# Patient Record
Sex: MALE | Race: WHITE | Employment: OTHER | URBAN - METROPOLITAN AREA
[De-identification: names, ages, dates, MRNs, and addresses within clinical notes are randomized per-mention and may not be internally consistent; named-entity substitution may affect disease eponyms.]

---

## 2017-02-06 ENCOUNTER — HOSPITAL ENCOUNTER (EMERGENCY)
Facility: HOSPITAL | Age: 37
Discharge: HOME | End: 2017-02-06
Attending: EMERGENCY MEDICINE
Payer: COMMERCIAL

## 2017-02-06 ENCOUNTER — APPOINTMENT (OUTPATIENT)
Dept: ULTRASOUND IMAGING | Facility: HOSPITAL | Age: 37
End: 2017-02-06
Attending: EMERGENCY MEDICINE
Payer: COMMERCIAL

## 2017-02-06 VITALS
SYSTOLIC BLOOD PRESSURE: 121 MMHG | RESPIRATION RATE: 18 BRPM | HEIGHT: 68 IN | OXYGEN SATURATION: 95 % | BODY MASS INDEX: 36.37 KG/M2 | TEMPERATURE: 98 F | WEIGHT: 240 LBS | DIASTOLIC BLOOD PRESSURE: 77 MMHG | HEART RATE: 60 BPM

## 2017-02-06 DIAGNOSIS — K76.0 FATTY LIVER: ICD-10-CM

## 2017-02-06 DIAGNOSIS — R10.13 ABDOMINAL PAIN, EPIGASTRIC: ICD-10-CM

## 2017-02-06 DIAGNOSIS — E78.1 HIGH TRIGLYCERIDES: Primary | ICD-10-CM

## 2017-02-06 LAB
ALBUMIN SERPL-MCNC: 3.6 G/DL (ref 3.5–4.8)
ALP LIVER SERPL-CCNC: 82 U/L (ref 45–117)
ALT SERPL-CCNC: 41 U/L (ref 17–63)
AST SERPL-CCNC: 10 U/L (ref 15–41)
BASOPHILS # BLD AUTO: 0.03 X10(3) UL (ref 0–0.1)
BASOPHILS NFR BLD AUTO: 0.3 %
BILIRUB SERPL-MCNC: 0.9 MG/DL (ref 0.1–2)
BUN BLD-MCNC: 9 MG/DL (ref 8–20)
CALCIUM BLD-MCNC: 8.9 MG/DL (ref 8.3–10.3)
CHLORIDE: 101 MMOL/L (ref 101–111)
CO2: 24 MMOL/L (ref 22–32)
CREAT BLD-MCNC: 1.14 MG/DL (ref 0.7–1.3)
EOSINOPHIL # BLD AUTO: 0.16 X10(3) UL (ref 0–0.3)
EOSINOPHIL NFR BLD AUTO: 1.6 %
ERYTHROCYTE [DISTWIDTH] IN BLOOD BY AUTOMATED COUNT: 12.7 % (ref 11.5–16)
GLUCOSE BLD-MCNC: 229 MG/DL (ref 70–99)
HCT VFR BLD AUTO: 40.1 % (ref 37–53)
HGB BLD-MCNC: 15 G/DL (ref 13–17)
IMMATURE GRANULOCYTE COUNT: 0.04 X10(3) UL (ref 0–1)
IMMATURE GRANULOCYTE RATIO %: 0.4 %
LIPASE: 190 U/L (ref 73–393)
LYMPHOCYTES # BLD AUTO: 1.74 X10(3) UL (ref 0.9–4)
LYMPHOCYTES NFR BLD AUTO: 17.3 %
M PROTEIN MFR SERPL ELPH: 7.2 G/DL (ref 6.1–8.3)
MCH RBC QN AUTO: 32.5 PG (ref 27–33.2)
MCHC RBC AUTO-ENTMCNC: 37.4 G/DL (ref 31–37)
MCV RBC AUTO: 86.8 FL (ref 80–99)
MONOCYTES # BLD AUTO: 0.66 X10(3) UL (ref 0.1–0.6)
MONOCYTES NFR BLD AUTO: 6.6 %
NEUTROPHIL ABS PRELIM: 7.4 X10 (3) UL (ref 1.3–6.7)
NEUTROPHILS # BLD AUTO: 7.4 X10(3) UL (ref 1.3–6.7)
NEUTROPHILS NFR BLD AUTO: 73.8 %
PLATELET # BLD AUTO: 110 10(3)UL (ref 150–450)
POTASSIUM SERPL-SCNC: 3.8 MMOL/L (ref 3.6–5.1)
RBC # BLD AUTO: 4.62 X10(6)UL (ref 4.3–5.7)
RED CELL DISTRIBUTION WIDTH-SD: 39.3 FL (ref 35.1–46.3)
SODIUM SERPL-SCNC: 135 MMOL/L (ref 136–144)
TRIGLYCERIDES: 1814 MG/DL (ref ?–150)
WBC # BLD AUTO: 10 X10(3) UL (ref 4–13)

## 2017-02-06 PROCEDURE — 85025 COMPLETE CBC W/AUTO DIFF WBC: CPT | Performed by: EMERGENCY MEDICINE

## 2017-02-06 PROCEDURE — 84478 ASSAY OF TRIGLYCERIDES: CPT | Performed by: EMERGENCY MEDICINE

## 2017-02-06 PROCEDURE — 99285 EMERGENCY DEPT VISIT HI MDM: CPT

## 2017-02-06 PROCEDURE — 80053 COMPREHEN METABOLIC PANEL: CPT | Performed by: EMERGENCY MEDICINE

## 2017-02-06 PROCEDURE — 96374 THER/PROPH/DIAG INJ IV PUSH: CPT

## 2017-02-06 PROCEDURE — 83690 ASSAY OF LIPASE: CPT | Performed by: EMERGENCY MEDICINE

## 2017-02-06 PROCEDURE — 76700 US EXAM ABDOM COMPLETE: CPT

## 2017-02-06 PROCEDURE — 99284 EMERGENCY DEPT VISIT MOD MDM: CPT

## 2017-02-06 PROCEDURE — 96375 TX/PRO/DX INJ NEW DRUG ADDON: CPT

## 2017-02-06 PROCEDURE — 96361 HYDRATE IV INFUSION ADD-ON: CPT

## 2017-02-06 RX ORDER — ONDANSETRON 2 MG/ML
4 INJECTION INTRAMUSCULAR; INTRAVENOUS ONCE
Status: COMPLETED | OUTPATIENT
Start: 2017-02-06 | End: 2017-02-06

## 2017-02-06 RX ORDER — OMEPRAZOLE 20 MG/1
20 CAPSULE, DELAYED RELEASE ORAL DAILY
Qty: 30 CAPSULE | Refills: 0 | Status: SHIPPED | OUTPATIENT
Start: 2017-02-06 | End: 2017-03-08

## 2017-02-06 RX ORDER — SODIUM CHLORIDE 9 MG/ML
1000 INJECTION, SOLUTION INTRAVENOUS ONCE
Status: DISCONTINUED | OUTPATIENT
Start: 2017-02-06 | End: 2017-02-06

## 2017-02-06 RX ORDER — HYDROMORPHONE HYDROCHLORIDE 1 MG/ML
1 INJECTION, SOLUTION INTRAMUSCULAR; INTRAVENOUS; SUBCUTANEOUS ONCE
Status: COMPLETED | OUTPATIENT
Start: 2017-02-06 | End: 2017-02-06

## 2017-02-06 NOTE — ED PROVIDER NOTES
Patient Seen in: [facility] Emergency Department    History   Patient presents with:  Abdomen/Flank Pain (GI/)    Stated Complaint: ABD. PAIN    HPI    She is very pleasant 28-year-old male who presents with epigastric pain for the last 2 days.   P Hypertension Maternal Grandmother    • Heart Surgery Maternal Grandmother      CABG   • Heart Disease Maternal Grandmother    • Allergies Sister    • Neurological Disorder Sister    • Arthritis Maternal Grandfather      TKA         Smoking Status: Never Sm Notable for the following:     Triglycerides 1814 (*)     All other components within normal limits   CBC W/ DIFFERENTIAL - Abnormal; Notable for the following:     .0 (*)     MCHC 37.4 (*)     Neutrophil Absolute Prelim 7.40 (*)     Neutrophil Abso pancreatitis  Disposition:  Discharge    Follow-up:  Aga Ley MD  [address] [place] [address] Drive  [address] [facility]  [phone]    In 2 days        Medications Prescribed:  Discharge Medication List as of 2/6/2017  8:52 AM    START taking these medi

## 2017-12-19 PROBLEM — E11.9 TYPE 2 DIABETES MELLITUS WITHOUT COMPLICATION, WITHOUT LONG-TERM CURRENT USE OF INSULIN (HCC): Status: ACTIVE | Noted: 2017-12-19

## 2018-10-03 ENCOUNTER — HOSPITAL (OUTPATIENT)
Dept: OTHER | Age: 38
End: 2018-10-03
Attending: EMERGENCY MEDICINE

## 2022-07-07 ENCOUNTER — OFFICE VISIT (OUTPATIENT)
Dept: FAMILY MEDICINE CLINIC | Facility: CLINIC | Age: 42
End: 2022-07-07
Payer: COMMERCIAL

## 2022-07-07 VITALS
TEMPERATURE: 97 F | HEIGHT: 68 IN | WEIGHT: 215 LBS | RESPIRATION RATE: 18 BRPM | SYSTOLIC BLOOD PRESSURE: 138 MMHG | OXYGEN SATURATION: 99 % | HEART RATE: 91 BPM | DIASTOLIC BLOOD PRESSURE: 88 MMHG | BODY MASS INDEX: 32.58 KG/M2

## 2022-07-07 DIAGNOSIS — H61.21 HEARING LOSS OF RIGHT EAR DUE TO CERUMEN IMPACTION: Primary | ICD-10-CM

## 2022-07-07 PROCEDURE — 3008F BODY MASS INDEX DOCD: CPT | Performed by: NURSE PRACTITIONER

## 2022-07-07 PROCEDURE — 99202 OFFICE O/P NEW SF 15 MIN: CPT | Performed by: NURSE PRACTITIONER

## 2022-07-07 PROCEDURE — 3075F SYST BP GE 130 - 139MM HG: CPT | Performed by: NURSE PRACTITIONER

## 2022-07-07 PROCEDURE — 3079F DIAST BP 80-89 MM HG: CPT | Performed by: NURSE PRACTITIONER

## 2022-07-07 RX ORDER — EZETIMIBE 10 MG/1
TABLET ORAL
COMMUNITY

## 2022-07-07 RX ORDER — EMPAGLIFLOZIN 25 MG/1
TABLET, FILM COATED ORAL
COMMUNITY

## 2022-07-07 RX ORDER — ROSUVASTATIN CALCIUM 40 MG/1
TABLET, COATED ORAL
COMMUNITY

## 2022-07-07 RX ORDER — ORAL SEMAGLUTIDE 7 MG/1
TABLET ORAL
COMMUNITY

## 2022-07-07 RX ORDER — SITAGLIPTIN 100 MG/1
100 TABLET, FILM COATED ORAL DAILY
COMMUNITY
Start: 2022-04-27

## 2023-02-07 ENCOUNTER — HOSPITAL ENCOUNTER (EMERGENCY)
Facility: HOSPITAL | Age: 43
Discharge: HOME | End: 2023-02-07
Attending: EMERGENCY MEDICINE
Payer: COMMERCIAL

## 2023-02-07 ENCOUNTER — APPOINTMENT (OUTPATIENT)
Dept: CT IMAGING | Facility: HOSPITAL | Age: 43
End: 2023-02-07
Attending: EMERGENCY MEDICINE
Payer: COMMERCIAL

## 2023-02-07 VITALS
HEIGHT: 68 IN | DIASTOLIC BLOOD PRESSURE: 92 MMHG | BODY MASS INDEX: 33.04 KG/M2 | WEIGHT: 218 LBS | SYSTOLIC BLOOD PRESSURE: 134 MMHG | TEMPERATURE: 98 F | HEART RATE: 98 BPM | RESPIRATION RATE: 18 BRPM | OXYGEN SATURATION: 98 %

## 2023-02-07 DIAGNOSIS — R79.89 CREATININE ELEVATION: ICD-10-CM

## 2023-02-07 DIAGNOSIS — R10.9 ABDOMINAL PAIN, UNSPECIFIED ABDOMINAL LOCATION: Primary | ICD-10-CM

## 2023-02-07 LAB
AGGLUTINATION: PRESENT
ALBUMIN SERPL-MCNC: 3.9 G/DL (ref 3.4–5)
ALBUMIN/GLOB SERPL: 1.2 {RATIO} (ref 1–2)
ALP LIVER SERPL-CCNC: 72 U/L
ATRIAL RATE: 98 BPM
BASOPHILS # BLD AUTO: 0.04 X10(3) UL (ref 0–0.2)
BASOPHILS NFR BLD AUTO: 0.4 %
BILIRUB SERPL-MCNC: 0.6 MG/DL (ref 0.1–2)
BUN BLD-MCNC: 9 MG/DL (ref 7–18)
CALCIUM BLD-MCNC: 8.6 MG/DL (ref 8.5–10.1)
CHLORIDE SERPL-SCNC: 107 MMOL/L (ref 98–112)
CREAT BLD-MCNC: 1.68 MG/DL
EOSINOPHIL # BLD AUTO: 0.08 X10(3) UL (ref 0–0.7)
EOSINOPHIL NFR BLD AUTO: 0.8 %
ERYTHROCYTE [DISTWIDTH] IN BLOOD BY AUTOMATED COUNT: 13.5 %
GFR SERPLBLD BASED ON 1.73 SQ M-ARVRAT: 52 ML/MIN/1.73M2 (ref 60–?)
GLOBULIN PLAS-MCNC: 3.3 G/DL (ref 2.8–4.4)
GLUCOSE BLD-MCNC: 234 MG/DL (ref 70–99)
HCT VFR BLD AUTO: 43.4 %
HGB BLD-MCNC: 15.5 G/DL
IMM GRANULOCYTES # BLD AUTO: 0.07 X10(3) UL (ref 0–1)
IMM GRANULOCYTES NFR BLD: 0.7 %
LIPASE SERPL-CCNC: 121 U/L (ref 73–393)
LIPASE SERPL-CCNC: 36 U/L (ref 13–75)
LYMPHOCYTES # BLD AUTO: 1.23 X10(3) UL (ref 1–4)
LYMPHOCYTES NFR BLD AUTO: 12 %
MCH RBC QN AUTO: 31.3 PG (ref 26–34)
MCHC RBC AUTO-ENTMCNC: 35.7 G/DL (ref 31–37)
MCV RBC AUTO: 87.7 FL
MONOCYTES # BLD AUTO: 0.46 X10(3) UL (ref 0.1–1)
MONOCYTES NFR BLD AUTO: 4.5 %
NEUTROPHILS # BLD AUTO: 8.34 X10 (3) UL (ref 1.5–7.7)
NEUTROPHILS # BLD AUTO: 8.34 X10(3) UL (ref 1.5–7.7)
NEUTROPHILS NFR BLD AUTO: 81.6 %
OSMOLALITY SERPL CALC.SUM OF ELEC: 282 MOSM/KG (ref 275–295)
P AXIS: 56 DEGREES
P-R INTERVAL: 186 MS
PLATELET # BLD AUTO: 242 10(3)UL (ref 150–450)
PLATELET MORPHOLOGY: NORMAL
POTASSIUM SERPL-SCNC: 3.9 MMOL/L (ref 3.5–5.1)
PROT SERPL-MCNC: 7.2 G/DL (ref 6.4–8.2)
Q-T INTERVAL: 350 MS
QRS DURATION: 86 MS
QTC CALCULATION (BEZET): 446 MS
R AXIS: 4 DEGREES
RBC # BLD AUTO: 4.95 X10(6)UL
SARS-COV-2 RNA RESP QL NAA+PROBE: NOT DETECTED
SODIUM SERPL-SCNC: 133 MMOL/L (ref 136–145)
T AXIS: 25 DEGREES
TROPONIN I HIGH SENSITIVITY: 3.1 NG/L
VENTRICULAR RATE: 98 BPM
WBC # BLD AUTO: 10.2 X10(3) UL (ref 4–11)

## 2023-02-07 PROCEDURE — 99285 EMERGENCY DEPT VISIT HI MDM: CPT

## 2023-02-07 PROCEDURE — 85025 COMPLETE CBC W/AUTO DIFF WBC: CPT | Performed by: EMERGENCY MEDICINE

## 2023-02-07 PROCEDURE — 93005 ELECTROCARDIOGRAM TRACING: CPT

## 2023-02-07 PROCEDURE — 80053 COMPREHEN METABOLIC PANEL: CPT | Performed by: EMERGENCY MEDICINE

## 2023-02-07 PROCEDURE — 96374 THER/PROPH/DIAG INJ IV PUSH: CPT

## 2023-02-07 PROCEDURE — 83690 ASSAY OF LIPASE: CPT | Performed by: EMERGENCY MEDICINE

## 2023-02-07 PROCEDURE — 84484 ASSAY OF TROPONIN QUANT: CPT | Performed by: EMERGENCY MEDICINE

## 2023-02-07 PROCEDURE — 93010 ELECTROCARDIOGRAM REPORT: CPT

## 2023-02-07 PROCEDURE — 74176 CT ABD & PELVIS W/O CONTRAST: CPT | Performed by: EMERGENCY MEDICINE

## 2023-02-07 PROCEDURE — 96361 HYDRATE IV INFUSION ADD-ON: CPT

## 2023-02-07 RX ORDER — MORPHINE SULFATE 4 MG/ML
4 INJECTION, SOLUTION INTRAMUSCULAR; INTRAVENOUS EVERY 30 MIN PRN
Status: DISCONTINUED | OUTPATIENT
Start: 2023-02-07 | End: 2023-02-07

## 2023-02-07 RX ORDER — FENOFIBRATE 145 MG/1
160 TABLET, COATED ORAL DAILY
COMMUNITY

## 2023-02-07 NOTE — ED QUICK NOTES
IV site D/Stepan, discharge instructions including follow up care given to pt who verbalized understanding.  PT left ED AAOX3 NAD with steady gait

## 2023-02-07 NOTE — ED INITIAL ASSESSMENT (HPI)
Pt to ED for upper abdominal pain since yesterday. Of high triglycerides and pancreatitis.  Denies vomiting diarrhea and fever

## 2023-02-07 NOTE — ED QUICK NOTES
Rounding Completed    Plan of Care reviewed. Waiting for lab results  Elimination needs assessed. Provided update on delay of lab work. Bed is locked and in lowest position. Call light within reach.

## 2024-02-05 ENCOUNTER — APPOINTMENT (OUTPATIENT)
Dept: CT IMAGING | Age: 44
End: 2024-02-05
Attending: EMERGENCY MEDICINE
Payer: COMMERCIAL

## 2024-02-05 ENCOUNTER — HOSPITAL ENCOUNTER (EMERGENCY)
Age: 44
Discharge: HOME | End: 2024-02-05
Attending: EMERGENCY MEDICINE
Payer: COMMERCIAL

## 2024-02-05 VITALS
OXYGEN SATURATION: 95 % | HEART RATE: 105 BPM | RESPIRATION RATE: 17 BRPM | BODY MASS INDEX: 33.04 KG/M2 | DIASTOLIC BLOOD PRESSURE: 81 MMHG | SYSTOLIC BLOOD PRESSURE: 132 MMHG | TEMPERATURE: 98 F | HEIGHT: 68 IN | WEIGHT: 218 LBS

## 2024-02-05 DIAGNOSIS — K85.80 OTHER ACUTE PANCREATITIS WITHOUT INFECTION OR NECROSIS: Primary | ICD-10-CM

## 2024-02-05 LAB
ALBUMIN SERPL-MCNC: 3.5 G/DL (ref 3.4–5)
ALP LIVER SERPL-CCNC: 75 U/L
ANION GAP SERPL CALC-SCNC: 5 MMOL/L (ref 0–18)
BASOPHILS # BLD AUTO: 0.02 X10(3) UL (ref 0–0.2)
BASOPHILS NFR BLD AUTO: 0.2 %
BILIRUB SERPL-MCNC: 1.1 MG/DL (ref 0.1–2)
BILIRUB UR QL STRIP.AUTO: NEGATIVE
CHLORIDE SERPL-SCNC: 99 MMOL/L (ref 98–112)
CLARITY UR REFRACT.AUTO: CLEAR
CO2 SERPL-SCNC: 26 MMOL/L (ref 21–32)
COLOR UR AUTO: YELLOW
CREAT BLD-MCNC: 0.94 MG/DL
EGFRCR SERPLBLD CKD-EPI 2021: 103 ML/MIN/1.73M2 (ref 60–?)
EOSINOPHIL # BLD AUTO: 0.06 X10(3) UL (ref 0–0.7)
EOSINOPHIL NFR BLD AUTO: 0.7 %
ERYTHROCYTE [DISTWIDTH] IN BLOOD BY AUTOMATED COUNT: 13.6 %
GLUCOSE BLD-MCNC: 248 MG/DL (ref 70–99)
GLUCOSE UR STRIP.AUTO-MCNC: >=1000 MG/DL
HCT VFR BLD AUTO: 41.1 %
HGB BLD-MCNC: 15.2 G/DL
IMM GRANULOCYTES # BLD AUTO: 0.06 X10(3) UL (ref 0–1)
IMM GRANULOCYTES NFR BLD: 0.7 %
KETONES UR STRIP.AUTO-MCNC: 40 MG/DL
LEUKOCYTE ESTERASE UR QL STRIP.AUTO: NEGATIVE
LIPASE SERPL-CCNC: 1020 U/L (ref 13–75)
LYMPHOCYTES # BLD AUTO: 1.08 X10(3) UL (ref 1–4)
LYMPHOCYTES NFR BLD AUTO: 12.3 %
MCH RBC QN AUTO: 31.3 PG (ref 26–34)
MCHC RBC AUTO-ENTMCNC: 37 G/DL (ref 31–37)
MCV RBC AUTO: 84.6 FL
MONOCYTES # BLD AUTO: 0.45 X10(3) UL (ref 0.1–1)
MONOCYTES NFR BLD AUTO: 5.1 %
NEUTROPHILS # BLD AUTO: 7.1 X10 (3) UL (ref 1.5–7.7)
NEUTROPHILS # BLD AUTO: 7.1 X10(3) UL (ref 1.5–7.7)
NEUTROPHILS NFR BLD AUTO: 81 %
NITRITE UR QL STRIP.AUTO: NEGATIVE
PH UR STRIP.AUTO: 5.5 [PH] (ref 5–8)
PLATELET # BLD AUTO: 271 10(3)UL (ref 150–450)
POTASSIUM SERPL-SCNC: 4.5 MMOL/L (ref 3.5–5.1)
PROT UR STRIP.AUTO-MCNC: NEGATIVE MG/DL
RBC # BLD AUTO: 4.86 X10(6)UL
RBC UR QL AUTO: NEGATIVE
SODIUM SERPL-SCNC: 130 MMOL/L (ref 136–145)
SP GR UR STRIP.AUTO: 1.02 (ref 1–1.03)
UROBILINOGEN UR STRIP.AUTO-MCNC: 0.2 MG/DL
WBC # BLD AUTO: 8.8 X10(3) UL (ref 4–11)

## 2024-02-05 PROCEDURE — 74176 CT ABD & PELVIS W/O CONTRAST: CPT | Performed by: EMERGENCY MEDICINE

## 2024-02-05 PROCEDURE — 99284 EMERGENCY DEPT VISIT MOD MDM: CPT

## 2024-02-05 PROCEDURE — 81003 URINALYSIS AUTO W/O SCOPE: CPT | Performed by: EMERGENCY MEDICINE

## 2024-02-05 PROCEDURE — 83690 ASSAY OF LIPASE: CPT | Performed by: EMERGENCY MEDICINE

## 2024-02-05 PROCEDURE — 85025 COMPLETE CBC W/AUTO DIFF WBC: CPT | Performed by: EMERGENCY MEDICINE

## 2024-02-05 PROCEDURE — 36415 COLL VENOUS BLD VENIPUNCTURE: CPT

## 2024-02-05 PROCEDURE — 80053 COMPREHEN METABOLIC PANEL: CPT | Performed by: EMERGENCY MEDICINE

## 2024-02-05 RX ORDER — ESOMEPRAZOLE MAGNESIUM 40 MG/1
40 CAPSULE, DELAYED RELEASE ORAL 2 TIMES DAILY
COMMUNITY

## 2024-02-05 RX ORDER — HYDROCODONE BITARTRATE AND ACETAMINOPHEN 5; 325 MG/1; MG/1
1-2 TABLET ORAL EVERY 6 HOURS PRN
Qty: 10 TABLET | Refills: 0 | Status: SHIPPED | OUTPATIENT
Start: 2024-02-05 | End: 2024-02-10

## 2024-02-06 NOTE — DISCHARGE INSTRUCTIONS
Clear liquid diet x 24 hours, then advance if tolerated.    Norco as needed for pain.    Call your PCP for follow-up tomorrow.    Prescription given to repeat lipase in 2 days    Return here if pain worsens or new symptoms develop

## 2024-02-06 NOTE — ED PROVIDER NOTES
Patient Seen in: [facility] [place]      History     Chief Complaint   Patient presents with    Abdomen/Flank Pain     Stated Complaint: abd pain LUQ X 1 hour    Subjective:   HPI    43-year-old male with history of recurrent pancreatitis due to hypertriglyceridemia presents for evaluation of left upper quadrant pain for the past 2 to 3 hours.  Pain started suddenly which is slightly dissimilar to previous related to pancreatitis.  No associated nausea or vomiting.  Patient had eaten some cheese and sausage about an hour prior to onset of pain.  No history of kidney stone although kidney stones run in the family.  No urinary symptoms.    Objective:   Past Medical History:   Diagnosis Date    Acute pancreatitis 8/2010    Diabetes (HCC)     Elevated liver function tests     Glucose intolerance 9/2010    HYPERTRIGLYCERIDEMIA     SEASONAL ALLERGIES               Past Surgical History:   Procedure Laterality Date    BACK SURGERY                  Social History     Socioeconomic History    Marital status:      Spouse name: Robert    Number of children: 0    Years of education: 14   Occupational History    Occupation: [employer]     Comment: unemployed 12/2009   Tobacco Use    Smoking status: Never    Smokeless tobacco: Never   Vaping Use    Vaping Use: Never used   Substance and Sexual Activity    Alcohol use: Yes     Alcohol/week: 5.0 standard drinks of alcohol     Types: 5 Standard drinks or equivalent per week     Comment: socially    Drug use: No    Sexual activity: Yes     Partners: Female   Other Topics Concern     Service No    Blood Transfusions No    Caffeine Concern Yes     Comment: soda    Occupational Exposure No    Hobby Hazards No    Sleep Concern No    Stress Concern Yes    Weight Concern No    Special Diet No    Back Care No    Exercise Yes     Comment: weights, walking    Bike Helmet No    Seat Belt Yes    Self-Exams Yes   Social History Narrative    Lives with wife               Review of Systems    Positive for stated complaint: abd pain LUQ X 1 hour  Other systems are as noted in HPI.  Constitutional and vital signs reviewed.      All other systems reviewed and negative except as noted above.    Physical Exam     ED Triage Vitals [02/05/24 1830]   BP (!) 155/92   Pulse 106   Resp 18   Temp 98.5 °F (36.9 °C)   Temp src Temporal   SpO2 98 %   O2 Device None (Room air)       Current:/81   Pulse 105   Temp 98.4 °F (36.9 °C) (Temporal)   Resp 17   Ht 172.7 cm (5' 8\")   Wt 98.9 kg   SpO2 95%   BMI 33.15 kg/m²         Physical Exam    General: Alert, oriented, no apparent distress   Neck: Supple  Lungs: Clear to auscultation bilaterally.  Heart: Regular rate and rhythm.  Abdomen: Soft, nontender.   Skin: No rash.  No edema.  Neurologic: No focal neurologic deficits.  Normal speech pattern  Musculoskeletal: No tenderness or deformity noted.  Full range of motion throughout.        ED Course     Labs Reviewed   COMP METABOLIC PANEL (14) - Abnormal; Notable for the following components:       Result Value    Glucose 248 (*)     Sodium 130 (*)     All other components within normal limits   URINALYSIS, ROUTINE - Abnormal; Notable for the following components:    Glucose Urine >=1000 (*)     Ketones Urine 40 (*)     All other components within normal limits   LIPASE - Abnormal; Notable for the following components:    Lipase 1,020 (*)     All other components within normal limits   CBC WITH DIFFERENTIAL WITH PLATELET    Narrative:     The following orders were created for panel order CBC With Differential With Platelet.  Procedure                               Abnormality         Status                     ---------                               -----------         ------                     CBC W/ DIFFERENTIAL[[id number]]                              Final result                 Please view results for these tests on the individual orders.   CBC W/ DIFFERENTIAL                       MDM      Differential diagnosis includes gastritis, pancreatitis, hepatitis      Lipase 1020    Chemistry with a glucose of 248.  Normal potassium and bicarb.  Normal creatinine.  AST and ALT cannot be resulted due to the lipemic specimen    Patient was comfortable while observed in the ER.  States episodes of pancreatitis in the past have been much worse than this.  Would prefer to go home and follow his lipase and symptoms as outpatient.  Understands clear liquid diet and to advanced as tolerated        Return here for reevaluation if pain increases or is accompanied by fever, weakness or vomiting                     Medical Decision Making  Amount and/or Complexity of Data Reviewed  Independent Historian: parent     Details: Mother at bedside        Disposition and Plan     Clinical Impression:  1. Other acute pancreatitis without infection or necrosis         Disposition:  Discharge  2/5/2024  9:06 pm    Follow-up:  Uday Betancourt MD  [address] [place] [id number] [phone]    Call in 1 day(s)            Medications Prescribed:  Current Discharge Medication List        START taking these medications    Details   HYDROcodone-acetaminophen 5-325 MG Oral Tab Take 1-2 tablets by mouth every 6 (six) hours as needed for Pain.  Qty: 10 tablet, Refills: 0    Associated Diagnoses: Other acute pancreatitis without infection or necrosis

## 2024-04-15 ENCOUNTER — APPOINTMENT (OUTPATIENT)
Dept: CT IMAGING | Facility: HOSPITAL | Age: 44
End: 2024-04-15
Attending: EMERGENCY MEDICINE
Payer: COMMERCIAL

## 2024-04-15 ENCOUNTER — HOSPITAL ENCOUNTER (INPATIENT)
Facility: HOSPITAL | Age: 44
LOS: 2 days | Discharge: HOME | End: 2024-04-18
Attending: EMERGENCY MEDICINE | Admitting: HOSPITALIST
Payer: COMMERCIAL

## 2024-04-15 DIAGNOSIS — K85.80 OTHER ACUTE PANCREATITIS WITHOUT INFECTION OR NECROSIS (HCC): ICD-10-CM

## 2024-04-15 DIAGNOSIS — R73.9 HYPERGLYCEMIA: ICD-10-CM

## 2024-04-15 DIAGNOSIS — E78.1 HYPERTRIGLYCERIDEMIA, FAMILIAL: Primary | ICD-10-CM

## 2024-04-15 PROBLEM — E87.1 HYPONATREMIA: Status: ACTIVE | Noted: 2024-04-15

## 2024-04-15 PROBLEM — E87.20 METABOLIC ACIDOSIS: Status: ACTIVE | Noted: 2024-04-15

## 2024-04-15 LAB
ALBUMIN SERPL-MCNC: 3.7 G/DL (ref 3.4–5)
ALBUMIN/GLOB SERPL: 0.9 {RATIO} (ref 1–2)
ALP LIVER SERPL-CCNC: 69 U/L
ALT SERPL-CCNC: 40 U/L
ANION GAP SERPL CALC-SCNC: 11 MMOL/L (ref 0–18)
BASOPHILS # BLD AUTO: 0.03 X10(3) UL (ref 0–0.2)
BASOPHILS NFR BLD AUTO: 0.3 %
BILIRUB SERPL-MCNC: 0.8 MG/DL (ref 0.1–2)
BUN BLD-MCNC: 8 MG/DL (ref 9–23)
CALCIUM BLD-MCNC: 8.3 MG/DL (ref 8.5–10.1)
CHLORIDE SERPL-SCNC: 99 MMOL/L (ref 98–112)
CO2 SERPL-SCNC: 21 MMOL/L (ref 21–32)
CREAT BLD-MCNC: 0.98 MG/DL
EGFRCR SERPLBLD CKD-EPI 2021: 98 ML/MIN/1.73M2 (ref 60–?)
EOSINOPHIL # BLD AUTO: 0.12 X10(3) UL (ref 0–0.7)
EOSINOPHIL NFR BLD AUTO: 1.2 %
ERYTHROCYTE [DISTWIDTH] IN BLOOD BY AUTOMATED COUNT: 14.3 %
GLOBULIN PLAS-MCNC: 4.3 G/DL (ref 2.8–4.4)
GLUCOSE BLD-MCNC: 250 MG/DL (ref 70–99)
HCT VFR BLD AUTO: 37.9 %
HGB BLD-MCNC: 16.6 G/DL
IMM GRANULOCYTES # BLD AUTO: 0.08 X10(3) UL (ref 0–1)
IMM GRANULOCYTES NFR BLD: 0.8 %
LIPASE SERPL-CCNC: 718 U/L (ref 13–75)
LYMPHOCYTES # BLD AUTO: 1.29 X10(3) UL (ref 1–4)
LYMPHOCYTES NFR BLD AUTO: 13.1 %
MCH RBC QN AUTO: 35.6 PG (ref 26–34)
MCHC RBC AUTO-ENTMCNC: 43.8 G/DL (ref 31–37)
MCV RBC AUTO: 81.3 FL
MONOCYTES # BLD AUTO: 0.53 X10(3) UL (ref 0.1–1)
MONOCYTES NFR BLD AUTO: 5.4 %
NEUTROPHILS # BLD AUTO: 7.82 X10 (3) UL (ref 1.5–7.7)
NEUTROPHILS # BLD AUTO: 7.82 X10(3) UL (ref 1.5–7.7)
NEUTROPHILS NFR BLD AUTO: 79.2 %
OSMOLALITY SERPL CALC.SUM OF ELEC: 279 MOSM/KG (ref 275–295)
PLATELET # BLD AUTO: 263 10(3)UL (ref 150–450)
PLATELETS.RETICULATED NFR BLD AUTO: 0.9 % (ref 0–7)
PROT SERPL-MCNC: 8 G/DL (ref 6.4–8.2)
RBC # BLD AUTO: 4.66 X10(6)UL
SODIUM SERPL-SCNC: 131 MMOL/L (ref 136–145)
WBC # BLD AUTO: 9.9 X10(3) UL (ref 4–11)

## 2024-04-15 PROCEDURE — 74177 CT ABD & PELVIS W/CONTRAST: CPT | Performed by: EMERGENCY MEDICINE

## 2024-04-15 RX ORDER — FAMOTIDINE 10 MG/ML
20 INJECTION, SOLUTION INTRAVENOUS ONCE
Status: COMPLETED | OUTPATIENT
Start: 2024-04-15 | End: 2024-04-15

## 2024-04-15 RX ORDER — HYDROMORPHONE HYDROCHLORIDE 1 MG/ML
1 INJECTION, SOLUTION INTRAMUSCULAR; INTRAVENOUS; SUBCUTANEOUS ONCE
Status: COMPLETED | OUTPATIENT
Start: 2024-04-15 | End: 2024-04-15

## 2024-04-15 RX ORDER — INSULIN ASPART 100 [IU]/ML
0.1 INJECTION, SOLUTION INTRAVENOUS; SUBCUTANEOUS ONCE
Status: COMPLETED | OUTPATIENT
Start: 2024-04-15 | End: 2024-04-16

## 2024-04-15 RX ORDER — SODIUM CHLORIDE 9 MG/ML
125 INJECTION, SOLUTION INTRAVENOUS CONTINUOUS
Status: DISCONTINUED | OUTPATIENT
Start: 2024-04-15 | End: 2024-04-16

## 2024-04-15 RX ORDER — ONDANSETRON 2 MG/ML
4 INJECTION INTRAMUSCULAR; INTRAVENOUS ONCE
Status: COMPLETED | OUTPATIENT
Start: 2024-04-15 | End: 2024-04-15

## 2024-04-16 ENCOUNTER — APPOINTMENT (OUTPATIENT)
Dept: INTERVENTIONAL RADIOLOGY/VASCULAR | Facility: HOSPITAL | Age: 44
End: 2024-04-16
Attending: INTERNAL MEDICINE
Payer: COMMERCIAL

## 2024-04-16 PROBLEM — K85.80 OTHER ACUTE PANCREATITIS WITHOUT INFECTION OR NECROSIS (HCC): Status: ACTIVE | Noted: 2024-04-16

## 2024-04-16 PROBLEM — E78.1 HYPERTRIGLYCERIDEMIA, FAMILIAL: Status: ACTIVE | Noted: 2024-04-16

## 2024-04-16 LAB
BILIRUB UR QL STRIP.AUTO: NEGATIVE
CLARITY UR REFRACT.AUTO: CLEAR
COLOR UR AUTO: YELLOW
EST. AVERAGE GLUCOSE BLD GHB EST-MCNC: 220 MG/DL (ref 68–126)
GLUCOSE BLD-MCNC: 134 MG/DL (ref 70–99)
GLUCOSE BLD-MCNC: 149 MG/DL (ref 70–99)
GLUCOSE BLD-MCNC: 171 MG/DL (ref 70–99)
GLUCOSE BLD-MCNC: 210 MG/DL (ref 70–99)
GLUCOSE BLD-MCNC: 241 MG/DL (ref 70–99)
GLUCOSE UR STRIP.AUTO-MCNC: >=1000 MG/DL
HBA1C MFR BLD: 9.3 % (ref ?–5.7)
KETONES UR STRIP.AUTO-MCNC: 15 MG/DL
LEUKOCYTE ESTERASE UR QL STRIP.AUTO: NEGATIVE
NITRITE UR QL STRIP.AUTO: NEGATIVE
PH UR STRIP.AUTO: 7 [PH] (ref 5–8)
PROT UR STRIP.AUTO-MCNC: NEGATIVE MG/DL
RBC UR QL AUTO: NEGATIVE
SP GR UR STRIP.AUTO: 1.01 (ref 1–1.03)
TRIGL SERPL-MCNC: >5000 MG/DL (ref 30–149)
UROBILINOGEN UR STRIP.AUTO-MCNC: 0.2 MG/DL

## 2024-04-16 PROCEDURE — 02HV33Z INSERTION OF INFUSION DEVICE INTO SUPERIOR VENA CAVA, PERCUTANEOUS APPROACH: ICD-10-PCS | Performed by: RADIOLOGY

## 2024-04-16 PROCEDURE — 99223 1ST HOSP IP/OBS HIGH 75: CPT | Performed by: HOSPITALIST

## 2024-04-16 RX ORDER — INSULIN DEGLUDEC 100 U/ML
10 INJECTION, SOLUTION SUBCUTANEOUS DAILY
Status: DISCONTINUED | OUTPATIENT
Start: 2024-04-16 | End: 2024-04-18

## 2024-04-16 RX ORDER — MELATONIN
3 NIGHTLY PRN
Status: DISCONTINUED | OUTPATIENT
Start: 2024-04-16 | End: 2024-04-18

## 2024-04-16 RX ORDER — MORPHINE SULFATE 4 MG/ML
4 INJECTION, SOLUTION INTRAMUSCULAR; INTRAVENOUS EVERY 2 HOUR PRN
Status: DISCONTINUED | OUTPATIENT
Start: 2024-04-16 | End: 2024-04-18

## 2024-04-16 RX ORDER — PROCHLORPERAZINE EDISYLATE 5 MG/ML
5 INJECTION INTRAMUSCULAR; INTRAVENOUS EVERY 8 HOURS PRN
Status: DISCONTINUED | OUTPATIENT
Start: 2024-04-16 | End: 2024-04-18

## 2024-04-16 RX ORDER — METHYLPREDNISOLONE SODIUM SUCCINATE 125 MG/2ML
60 INJECTION, POWDER, LYOPHILIZED, FOR SOLUTION INTRAMUSCULAR; INTRAVENOUS ONCE AS NEEDED
Status: ACTIVE | OUTPATIENT
Start: 2024-04-16 | End: 2024-04-16

## 2024-04-16 RX ORDER — ALBUMIN, HUMAN INJ 5% 5 %
3000 SOLUTION INTRAVENOUS ONCE
Status: COMPLETED | OUTPATIENT
Start: 2024-04-16 | End: 2024-04-16

## 2024-04-16 RX ORDER — MORPHINE SULFATE 4 MG/ML
2 INJECTION, SOLUTION INTRAMUSCULAR; INTRAVENOUS EVERY 2 HOUR PRN
Status: DISCONTINUED | OUTPATIENT
Start: 2024-04-16 | End: 2024-04-18

## 2024-04-16 RX ORDER — SODIUM CHLORIDE, SODIUM LACTATE, POTASSIUM CHLORIDE, CALCIUM CHLORIDE 600; 310; 30; 20 MG/100ML; MG/100ML; MG/100ML; MG/100ML
INJECTION, SOLUTION INTRAVENOUS ONCE
Status: COMPLETED | OUTPATIENT
Start: 2024-04-16 | End: 2024-04-16

## 2024-04-16 RX ORDER — HYDROMORPHONE HYDROCHLORIDE 1 MG/ML
0.5 INJECTION, SOLUTION INTRAMUSCULAR; INTRAVENOUS; SUBCUTANEOUS EVERY 30 MIN PRN
Status: ACTIVE | OUTPATIENT
Start: 2024-04-16 | End: 2024-04-16

## 2024-04-16 RX ORDER — HEPARIN SODIUM 5000 [USP'U]/ML
INJECTION, SOLUTION INTRAVENOUS; SUBCUTANEOUS
Status: COMPLETED
Start: 2024-04-16 | End: 2024-04-16

## 2024-04-16 RX ORDER — SODIUM CHLORIDE 9 MG/ML
INJECTION, SOLUTION INTRAVENOUS CONTINUOUS
Status: ACTIVE | OUTPATIENT
Start: 2024-04-16 | End: 2024-04-16

## 2024-04-16 RX ORDER — DIPHENHYDRAMINE HYDROCHLORIDE 50 MG/ML
25 INJECTION INTRAMUSCULAR; INTRAVENOUS ONCE
Status: COMPLETED | OUTPATIENT
Start: 2024-04-16 | End: 2024-04-16

## 2024-04-16 RX ORDER — NICOTINE POLACRILEX 4 MG
15 LOZENGE BUCCAL
Status: DISCONTINUED | OUTPATIENT
Start: 2024-04-16 | End: 2024-04-18

## 2024-04-16 RX ORDER — LIDOCAINE HYDROCHLORIDE 10 MG/ML
INJECTION, SOLUTION INFILTRATION; PERINEURAL
Status: COMPLETED
Start: 2024-04-16 | End: 2024-04-16

## 2024-04-16 RX ORDER — ACETAMINOPHEN 500 MG
500 TABLET ORAL EVERY 4 HOURS PRN
Status: DISCONTINUED | OUTPATIENT
Start: 2024-04-16 | End: 2024-04-18

## 2024-04-16 RX ORDER — MORPHINE SULFATE 4 MG/ML
1 INJECTION, SOLUTION INTRAMUSCULAR; INTRAVENOUS EVERY 2 HOUR PRN
Status: DISCONTINUED | OUTPATIENT
Start: 2024-04-16 | End: 2024-04-18

## 2024-04-16 RX ORDER — SODIUM CHLORIDE, SODIUM LACTATE, POTASSIUM CHLORIDE, CALCIUM CHLORIDE 600; 310; 30; 20 MG/100ML; MG/100ML; MG/100ML; MG/100ML
INJECTION, SOLUTION INTRAVENOUS CONTINUOUS
Status: DISCONTINUED | OUTPATIENT
Start: 2024-04-16 | End: 2024-04-18

## 2024-04-16 RX ORDER — ONDANSETRON 2 MG/ML
4 INJECTION INTRAMUSCULAR; INTRAVENOUS EVERY 4 HOURS PRN
Status: ACTIVE | OUTPATIENT
Start: 2024-04-16 | End: 2024-04-16

## 2024-04-16 RX ORDER — NICOTINE POLACRILEX 4 MG
30 LOZENGE BUCCAL
Status: DISCONTINUED | OUTPATIENT
Start: 2024-04-16 | End: 2024-04-18

## 2024-04-16 RX ORDER — DEXTROSE MONOHYDRATE 25 G/50ML
50 INJECTION, SOLUTION INTRAVENOUS
Status: DISCONTINUED | OUTPATIENT
Start: 2024-04-16 | End: 2024-04-18

## 2024-04-16 RX ORDER — ENOXAPARIN SODIUM 100 MG/ML
40 INJECTION SUBCUTANEOUS DAILY
Status: DISCONTINUED | OUTPATIENT
Start: 2024-04-16 | End: 2024-04-18

## 2024-04-16 RX ORDER — DIPHENHYDRAMINE HCL 25 MG
25 CAPSULE ORAL ONCE
Status: COMPLETED | OUTPATIENT
Start: 2024-04-16 | End: 2024-04-16

## 2024-04-16 RX ORDER — ONDANSETRON 2 MG/ML
4 INJECTION INTRAMUSCULAR; INTRAVENOUS EVERY 6 HOURS PRN
Status: DISCONTINUED | OUTPATIENT
Start: 2024-04-16 | End: 2024-04-18

## 2024-04-16 NOTE — CONSULTS
[facility]  Report of Consultation    Ramón Nunez Patient Status:  Inpatient    10/10/1980 MRN [id number]   Location [facility] 0SW-A Attending Woody Valdovinos MD   Hosp Day # 0 PCP Uday Betancourt MD     Reason for Consultation:  Hypertriglyceridemia  Pancreatitis - acute     History of Present Illness:  Ramón Nunez is a a(n) 43 year old male with hx of DM, familial hypertriglyceridemia , who presnets to the hospital w/ complain of abdominal pain.   He has has prior episodes of acute pancreatitis related to TGs    Elevated lipase 718  A1C9.3  TG>5000    CT findings c/w with acute pancreatitis     History:  Past Medical History:    Acute pancreatitis (HCC)    Diabetes (HCC)    Elevated liver function tests    Glucose intolerance    HYPERTRIGLYCERIDEMIA    SEASONAL ALLERGIES     Past Surgical History:   Procedure Laterality Date    Back surgery       Family History   Problem Relation Age of Onset    Heart Disease Paternal Grandmother         CONGESTIVE HEART FAILURE    Lipids Paternal Grandmother     Diabetes Paternal Grandmother     Neurological Disorder Paternal Grandfather         alzheimer    Heart Disease Paternal Grandfather     Cancer Paternal Grandfather         skin    Stroke Paternal Grandfather     High Cholesterol Father     Lipids Father     Diabetes Father     Obesity Father     Gastro-Intestinal Disorder Father         GERD, diverticulosis    Arthritis Maternal Grandmother         SLE    High Cholesterol Maternal Grandmother     Hypertension Maternal Grandmother     Heart Surgery Maternal Grandmother         CABG    Heart Disease Maternal Grandmother     Allergies Sister     Neurological Disorder Sister     Arthritis Maternal Grandfather         TKA      reports that he has never smoked. He has never used smokeless tobacco. He reports current alcohol use of about 5.0 standard drinks of alcohol per week. He reports that he does not use drugs.    Allergies:  No Known Allergies    Current  Medications:    Current Facility-Administered Medications:     glucose (Dex4) 15 GM/59ML oral liquid 15 g, 15 g, Oral, Q15 Min PRN **OR** glucose (Glutose) 40% oral gel 15 g, 15 g, Oral, Q15 Min PRN **OR** glucose-vitamin C (Dex-4) chewable tab 4 tablet, 4 tablet, Oral, Q15 Min PRN **OR** dextrose 50% injection 50 mL, 50 mL, Intravenous, Q15 Min PRN **OR** glucose (Dex4) 15 GM/59ML oral liquid 30 g, 30 g, Oral, Q15 Min PRN **OR** glucose (Glutose) 40% oral gel 30 g, 30 g, Oral, Q15 Min PRN **OR** glucose-vitamin C (Dex-4) chewable tab 8 tablet, 8 tablet, Oral, Q15 Min PRN    melatonin tab 3 mg, 3 mg, Oral, Nightly PRN    lactated ringers infusion, , Intravenous, Continuous    enoxaparin (Lovenox) 40 MG/0.4ML SUBQ injection 40 mg, 40 mg, Subcutaneous, Daily    acetaminophen (Tylenol Extra Strength) tab 500 mg, 500 mg, Oral, Q4H PRN    morphINE PF 4 MG/ML injection 1 mg, 1 mg, Intravenous, Q2H PRN **OR** morphINE PF 4 MG/ML injection 2 mg, 2 mg, Intravenous, Q2H PRN **OR** morphINE PF 4 MG/ML injection 4 mg, 4 mg, Intravenous, Q2H PRN    ondansetron (Zofran) 4 MG/2ML injection 4 mg, 4 mg, Intravenous, Q6H PRN    prochlorperazine (Compazine) 10 MG/2ML injection 5 mg, 5 mg, Intravenous, Q8H PRN    insulin aspart (NovoLOG) 100 Units/mL FlexPen 1-10 Units, 1-10 Units, Subcutaneous, TID AC and HS  Home Medications:  No current outpatient medications on file.       Review of Systems:  See HPI; A total of 12 systems reviewed and otherwise unremarkable.    Physical Exam:  Vital signs: Blood pressure 144/85, pulse 112, temperature 97.7 °F (36.5 °C), temperature source Temporal, resp. rate 18, height 5' 8\" (1.727 m), weight 216 lb 6.4 oz (98.2 kg), SpO2 97%.  General: No acute distress. Alert and oriented x 3.  HEENT: Moist mucous membranes. EOM-I. PERRL  Neck: No lymphadenopathy.  No JVD. No carotid bruits.  Respiratory: Clear to auscultation bilaterally.  No wheezes. No rhonchi.  Cardiovascular: S1, S2.  Regular rate and  rhythm.  No murmurs. Equal pulses   Abdomen: Soft, ND:+ abd pain.    Neurologic: No focal neurological deficits.   Musculoskeletal: Full range of motion of all extremities.  No swelling noted.   Integument: No lesions. No erythema.  Psychiatric: Appropriate mood and affect.    Laboratory:  Lab Results   Component Value Date    WBC 9.9 04/15/2024    HGB 16.6 04/15/2024    HCT 37.9 04/15/2024    .0 04/15/2024    CREATSERUM 0.98 04/15/2024    BUN 8 04/15/2024     04/15/2024    K  04/15/2024      Comment:      Specimen hemolyzed. Specimen needs to be recollected.      CL 99 04/15/2024    CO2 21.0 04/15/2024     04/15/2024    CA 8.3 04/15/2024    ALB 3.7 04/15/2024    ALKPHO 69 04/15/2024    BILT 0.8 04/15/2024    TP 8.0 04/15/2024    AST  04/15/2024      Comment:      Specimen hemolyzed. Specimen needs to be recollected.     ALT 40 04/15/2024     04/15/2024    PGLU 210 04/16/2024     Lab Results   Component Value Date    COLORUR Yellow 04/16/2024    CLARITY Clear 04/16/2024    SPECGRAVITY 1.015 04/16/2024    GLUUR >=1000 04/16/2024    BILUR Negative 04/16/2024    KETUR 15 04/16/2024    BLOODURINE Negative 04/16/2024    PHURINE 7.0 04/16/2024    PROUR Negative 04/16/2024    UROBILINOGEN 0.2 04/16/2024    NITRITE Negative 04/16/2024    LEUUR Negative 04/16/2024       BUN   Date Value   04/15/2024 8 mg/dL (L)   02/05/2024      Comment:     Unable to report due to lipemia; spoke to [id number]     02/07/2023 9 mg/dL     Blood Urea Nitrogen (mg/dL)   Date Value   12/09/2017 11   06/06/2017 11   05/18/2016 16   04/11/2014 15   05/11/2012 15     Creatinine, Serum (mg/dL)   Date Value   05/18/2016 1.04   04/11/2014 1.15   05/11/2012 1.24     Creatinine (mg/dL)   Date Value   04/15/2024 0.98   02/05/2024 0.94   02/07/2023 1.68 (H)   12/09/2017 0.97   06/06/2017 1.22         Imaging:  Impression   CONCLUSION:       Inflammatory changes involving the pancreatic head and uncinate process is also extend around  the duodenum.  This is likely sequelae of acute pancreatitis.  Sequelae of duodenitis is of consideration as well     Impression:  Acute pancreatitis related to hypertriglyceridemia  (reportedly familial)  - fluids/NPO  - pain control  - discussed role of PLEX w/ patient- risk/benefits reviewed- he wants to think about it and discuss w/ wife. Will let us know if he wants to proceed  DM; poor control; A1C 9.3  Obesity   HTN - controlled; monitor   GERD    Care reviewed w/ pt and RN    Thank you for allowing me to participate in this patient's care.  Please feel free to call me with any questions or concerns.    Milan Iniguez MD  4/16/2024

## 2024-04-16 NOTE — ED QUICK NOTES
Orders for admission, patient is aware of plan and ready to go upstairs. Any questions, please call ED RN diogo at extension [phone].     Patient Covid vaccination status: Fully vaccinated     COVID Test Ordered in ED: None    COVID Suspicion at Admission: N/A    Running Infusions:  lactated ringers    Mental Status/LOC at time of transport: A&O x3     Other pertinent information:   CIWA score: N/A   NIH score:  N/A

## 2024-04-16 NOTE — ED PROVIDER NOTES
Patient Seen in: [facility] Emergency Department      History     Chief Complaint   Patient presents with    Abdomen/Flank Pain     Stated Complaint: LOWER ABD PAIN, NO FEVER, NO N/V/D    Subjective:   HPI    42 yo with pain.  It feels like his pancreatitis.  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  He has a familial history of hypertriglyceridemia it was found incidentally when the patient was started on Accutane and they were trying to follow his LFTs but they could not result in the lab.  Since that time patient has been treated for his high triglycerides but at times his triglycerides spike and he gets pancreatitis.  He occasionally drinks beer because he is a  but it is not excessive.  He does and has in the past seen cardiology and his primary care physician he does take fenofibrate he does take a statin as well as fish oil but his triglycerides have remained really high and states that he very frequently has difficulty when he gets blood drawn he is recalled because blood cannot be interpreted.  He is again having an increase in abdominal pain so comes to the emergency department for treatment and evaluation.    Objective:   Past Medical History:    Acute pancreatitis (HCC)    Diabetes (HCC)    Elevated liver function tests    Glucose intolerance    HYPERTRIGLYCERIDEMIA    SEASONAL ALLERGIES              Past Surgical History:   Procedure Laterality Date    Back surgery                  Social History     Socioeconomic History    Marital status:      Spouse name: Robert    Number of children: 0    Years of education: 14   Occupational History    Occupation:      Comment: unemployed 12/2009   Tobacco Use    Smoking status: Never    Smokeless tobacco: Never   Vaping Use    Vaping status: Never Used   Substance and Sexual Activity    Alcohol use: Yes     Alcohol/week: 5.0 standard drinks of alcohol     Types: 5 Standard drinks or  equivalent per week     Comment: socially    Drug use: No    Sexual activity: Yes     Partners: Female   Other Topics Concern     Service No    Blood Transfusions No    Caffeine Concern Yes     Comment: soda    Occupational Exposure No    Hobby Hazards No    Sleep Concern No    Stress Concern Yes    Weight Concern No    Special Diet No    Back Care No    Exercise Yes     Comment: weights, walking    Bike Helmet No    Seat Belt Yes    Self-Exams Yes   Social History Narrative    Lives with wife              Review of Systems    Positive for stated complaint: LOWER ABD PAIN, NO FEVER, NO N/V/D  Other systems are as noted in HPI.  Constitutional and vital signs reviewed.      All other systems reviewed and negative except as noted above.    Physical Exam     ED Triage Vitals [04/15/24 1955]   BP (!) 145/92   Pulse 103   Resp 18   Temp 97.7 °F (36.5 °C)   Temp src Temporal   SpO2 96 %   O2 Device None (Room air)       Current:/86   Pulse 102   Temp 97.7 °F (36.5 °C) (Temporal)   Resp 18   Ht 172.7 cm (5' 8\")   Wt 97.5 kg   SpO2 94%   BMI 32.69 kg/m²         Physical Exam  Vitals and nursing note reviewed.   Constitutional:       General: He is not in acute distress.     Appearance: He is well-developed. He is not diaphoretic.   HENT:      Head: Atraumatic.      Right Ear: External ear normal.      Left Ear: External ear normal.      Nose: Nose normal.      Mouth/Throat:      Comments: Very dry oral mucosa  Eyes:      General: No scleral icterus.        Right eye: No discharge.         Left eye: No discharge.      Conjunctiva/sclera: Conjunctivae normal.      Pupils: Pupils are equal, round, and reactive to light.   Neck:      Vascular: No JVD.   Cardiovascular:      Rate and Rhythm: Normal rate and regular rhythm.      Heart sounds: Normal heart sounds. No murmur heard.     No friction rub. No gallop.   Pulmonary:      Effort: Pulmonary effort is normal. No respiratory distress.      Breath sounds:  Normal breath sounds. No stridor. No wheezing.   Chest:      Chest wall: No tenderness.   Abdominal:      General: Bowel sounds are normal. There is no distension.      Palpations: Abdomen is soft.      Tenderness: There is abdominal tenderness in the right upper quadrant, epigastric area and left upper quadrant. There is no guarding or rebound.   Musculoskeletal:         General: No tenderness or deformity. Normal range of motion.      Cervical back: Normal range of motion and neck supple.   Lymphadenopathy:      Cervical: No cervical adenopathy.   Skin:     General: Skin is warm and dry.      Coloration: Skin is not pale.      Findings: No erythema or rash.   Neurological:      Mental Status: He is alert and oriented to person, place, and time.      Cranial Nerves: No cranial nerve deficit.      Coordination: Coordination normal.   Psychiatric:         Behavior: Behavior normal.         Judgment: Judgment normal.               ED Course     Labs Reviewed   COMP METABOLIC PANEL (14) - Abnormal; Notable for the following components:       Result Value    Glucose 250 (*)     Sodium 131 (*)     BUN 8 (*)     A/G Ratio 0.9 (*)     All other components within normal limits   LIPASE - Abnormal; Notable for the following components:    Lipase 718 (*)     All other components within normal limits   CALCIUM - Abnormal; Notable for the following components:    Calcium, Total 8.3 (*)     All other components within normal limits   POCT GLUCOSE - Abnormal; Notable for the following components:    POC Glucose 241 (*)     All other components within normal limits   CBC W/ DIFFERENTIAL - Abnormal; Notable for the following components:    HCT 37.9 (*)     MCH 35.6 (*)     MCHC 43.8 (*)     Neutrophil Absolute Prelim 7.82 (*)     Neutrophil Absolute 7.82 (*)     All other components within normal limits   CBC WITH DIFFERENTIAL WITH PLATELET    Narrative:     The following orders were created for panel order CBC With Differential  With Platelet.  Procedure                               Abnormality         Status                     ---------                               -----------         ------                     CBC W/ DIFFERENTIAL[[id number]]          Abnormal            Final result                 Please view results for these tests on the individual orders.   REDRAW CMP (P)   URINALYSIS WITH CULTURE REFLEX   TRIGLYCERIDES   RAINBOW DRAW LAVENDER   RAINBOW DRAW LIGHT GREEN   RAINBOW DRAW BLUE   RAINBOW DRAW GOLD          ED Course as of 04/16/24 0012  ------------------------------------------------------------  Time: 04/15 2137  Value: Lipase(!!): 718  Comment: (Reviewed)  ------------------------------------------------------------  Time: 04/15 2137  Value: Sodium(!): 131  Comment: (Reviewed)  ------------------------------------------------------------  Time: 04/15 2137  Value: WBC: 9.9  Comment: (Reviewed)  ------------------------------------------------------------  Time: 04/15 2137  Value: Glucose(!): 250  Comment: (Reviewed)     CT ABDOMEN+PELVIS(CONTRAST ONLY)(CPT=74177)   Final Result   PROCEDURE:  CT ABDOMEN+PELVIS (CONTRAST ONLY) (CPT=74177)       COMPARISON:  [facility], CT, CT ABDOMEN+PELVIS KIDNEYSTONE 2D RNDR(NO    IV,NO ORAL)(CPT=74176), 2/05/2024, 7:54 PM.       INDICATIONS:  LOWER ABD PAIN, NO FEVER, NO N/V/D       TECHNIQUE:  CT scanning was performed from the dome of the diaphragm to    the pubic symphysis with non-ionic intravenous contrast material. Post    contrast coronal MPR imaging was performed.  Dose reduction techniques    were used. Dose information is    transmitted to the ACR (American College of Radiology) NRDR (National    Radiology Data Registry) which includes the Dose Index Registry.       PATIENT STATED HISTORY:(As transcribed by Technologist)  lower abd pain        CONTRAST USED:  100 mLcc of Isovue 370       FINDINGS:     LIVER:  No enlargement, atrophy, abnormal density, or significant focal     lesion.     BILIARY:  No visible dilatation or calcification.     PANCREAS:  Inflammatory changes involving the pancreatic head and uncinate    process extend around the duodenum.   SPLEEN:  Borderline enlarged spleen.   KIDNEYS:  No mass, obstruction, or calcification.     ADRENALS:  No mass or enlargement.     AORTA/VASCULAR:  No aneurysm or dissection.     RETROPERITONEUM:  No mass or adenopathy.     BOWEL/MESENTERY:  No visible mass, obstruction, or bowel wall thickening.     The appendix is normal.   ABDOMINAL WALL:  No mass or hernia.     URINARY BLADDER:  No visible focal wall thickening, lesion, or calculus.     PELVIC NODES:  No adenopathy.     PELVIC ORGANS:  No visible mass.  Pelvic organs appropriate for patient    age.     BONES:  No bony lesion or fracture.     LUNG BASES:  No visible pulmonary or pleural disease.     OTHER:  Negative.                           =====   CONCLUSION:         Inflammatory changes involving the pancreatic head and uncinate process is    also extend around the duodenum.  This is likely sequelae of acute    pancreatitis.  Sequelae of duodenitis is of consideration as well.           LOCATION:  [facility]           Dictated by (CST): Kb Workman MD on 4/15/2024 at 11:00 PM        Finalized by (CST): Kb Workman MD on 4/15/2024 at 11:02 PM         CT done to rule out pancreatic pseudocyst do suspect the patient has acute pancreatitis I do not see signs of necrotizing pancreatitis.           MDM      43-year-old presents to the emergency department with his wife for concern for upper abdominal pain feels like he has pancreatitis again.  Unfortunately he has significant history of severe familial hypertriglyceridemia    Immediately started aggressive IV fluid hydration, pain control, insulin to help drive down the triglycerides.  Will check the triglycerides to see how high they are.  Discussed the case with Dr. Ortega for admission.  Patient has never had  plasmapheresis lab is called twice tells me that cannot even centrifuge down his blood to get appropriate reading secondary to severe hypertriglyceridemia.    Did have a couple beers over the weekend with the very nice weather but it does come to the point that he almost never has walking at all he feels like it is possible he did not hydrate well enough he has excessive thirst all the time                           Medical Decision Making      Disposition and Plan     Clinical Impression:  1. Hypertriglyceridemia, familial    2. Other acute pancreatitis without infection or necrosis (HCC)    3. Hyperglycemia         Disposition:  There is no disposition on file for this visit.  There is no disposition time on file for this visit.    Follow-up:  No follow-up provider specified.        Medications Prescribed:  Current Discharge Medication List

## 2024-04-16 NOTE — PLAN OF CARE
Plasmapheresis ordered earlier. [facility] notified-call when line is placed. Reviewed with IR several times about timing of catheter insertion, still waiting for it to be done.     1610-updated Dr. Iniguez about still waiting for catheter placement. Wants to get it done today. Patient to transfer to different room.

## 2024-04-16 NOTE — H&P
[facility]     Ramón Nunez Patient Status:  Emergency    10/10/1980 MRN [id number]   Location [facility] EMERGENCY DEPARTMENT Attending Tati Downs MD   Hosp Day # 0 PCP Uday Betancourt MD     Chief Complaint: Abdominal pain    Subjective:    History of Present Illness:     Ramón Nunez is a 43 year old male with history of familial hypertriglyceridemia, type 2 diabetes presents to the emergency room with increased abdominal pain similar to previous episodes of pancreatitis.  Patient has history of familial hypertriglyceridemia was started on Accutane..  Patient reportedly is on a drug trial through [facility].  Patient denies any nausea, vomiting, diarrhea or constipation.  No chest pain, shortness of breath, dizziness, lightheadedness, or syncope.    History/Other:    Past Medical History:  Past Medical History:    Acute pancreatitis (HCC)    Diabetes (HCC)    Elevated liver function tests    Glucose intolerance    HYPERTRIGLYCERIDEMIA    SEASONAL ALLERGIES     Past Surgical History:   Past Surgical History:   Procedure Laterality Date    Back surgery        Family History:   Family History   Problem Relation Age of Onset    Heart Disease Paternal Grandmother         CONGESTIVE HEART FAILURE    Lipids Paternal Grandmother     Diabetes Paternal Grandmother     Neurological Disorder Paternal Grandfather         alzheimer    Heart Disease Paternal Grandfather     Cancer Paternal Grandfather         skin    Stroke Paternal Grandfather     High Cholesterol Father     Lipids Father     Diabetes Father     Obesity Father     Gastro-Intestinal Disorder Father         GERD, diverticulosis    Arthritis Maternal Grandmother         SLE    High Cholesterol Maternal Grandmother     Hypertension Maternal Grandmother     Heart Surgery Maternal Grandmother         CABG    Heart Disease Maternal Grandmother     Allergies Sister     Neurological Disorder Sister     Arthritis Maternal  Grandfather         TKA     Social History:    reports that he has never smoked. He has never used smokeless tobacco. He reports current alcohol use of about 5.0 standard drinks of alcohol per week. He reports that he does not use drugs.     Allergies: No Known Allergies    Medications:    No current facility-administered medications on file prior to encounter.     Current Outpatient Medications on File Prior to Encounter   Medication Sig Dispense Refill    [] HYDROcodone-acetaminophen 5-325 MG Oral Tab Take 1-2 tablets by mouth every 6 (six) hours as needed for Pain. 10 tablet 0    FREESTYLE LANCETS Does not apply Misc Test daily and prn 100 Each 3       Review of Systems:   A comprehensive review of systems was completed.    Pertinent positives and negatives noted in the HPI.    Objective:   Physical Exam:    /86   Pulse 102   Temp 97.7 °F (36.5 °C) (Temporal)   Resp 18   Ht 5' 8\" (1.727 m)   Wt 215 lb (97.5 kg)   SpO2 94%   BMI 32.69 kg/m²   General: No acute distress, Alert  Respiratory: No rhonchi, no wheezes  Cardiovascular: S1, S2. Regular rate and rhythm  Abdomen: Soft, Non-tender, non-distended, positive bowel sounds  Neuro: No new focal deficits  Extremities: No edema      Results:    Labs:      Labs Last 24 Hours:    Recent Labs   Lab 04/15/24  2004   RBC 4.66   HGB 16.6   HCT 37.9*   MCV 81.3   MCH 35.6*   MCHC 43.8*   RDW 14.3   NEPRELIM 7.82*   WBC 9.9   .0       Recent Labs   Lab 04/15/24  2004 04/15/24  2143   *  --    BUN 8*  --    CREATSERUM 0.98  --    EGFRCR 98  --    CA  --  8.3*   ALB 3.7  --    *  --    CL 99  --    CO2 21.0  --    ALKPHO 69  --    ALT 40  --    BILT 0.8  --    TP 8.0  --        No results found for: \"PT\", \"INR\"    No results for input(s): \"TROP\", \"TROPHS\", \"CK\" in the last 168 hours.    No results for input(s): \"TROP\", \"PBNP\" in the last 168 hours.    No results for input(s): \"PCT\" in the last 168 hours.    Imaging: Imaging data  reviewed in Epic.    Assessment & Plan:      # Acute pancreatitis  -Secondary to hypertriglyceridemia  -Lipid panel pending  -Nephrology on consult  -Strict n.p.o.  -Continue IV fluids  -Continue IV pain medication as needed.    # Hypertriglyceridemia  -Patient reportedly on a drug trial  -Patient supposedly to find out in the next week or so if he was actually getting the medication versus placebo.    # Type 2 diabetes  -Placed on hypoglycemia protocol with correction factor insulin.    Plan of care discussed with patient at bedside.    Levi Alex, DO    Supplementary Documentation:     The 21st Century Cures Act makes medical notes like these available to patients in the interest of transparency. Please be advised this is a medical document. Medical documents are intended to carry relevant information, facts as evident, and the clinical opinion of the practitioner. The medical note is intended as peer to peer communication and may appear blunt or direct. It is written in medical language and may contain abbreviations or verbiage that are unfamiliar.

## 2024-04-16 NOTE — ED INITIAL ASSESSMENT (HPI)
Patient here with c/o abdominal pain.  Patient has hx of pancreatitis and reports pain feels the same.  Denies N/V/D

## 2024-04-16 NOTE — PLAN OF CARE
Assumed patient care this morning.   Alert, awake. Breathing unlabored. Pain controlled. Npo. Bowel movement this morning. Seen by renal, agreed to plasmapheresis.

## 2024-04-17 LAB
ALBUMIN SERPL-MCNC: 3.7 G/DL (ref 3.4–5)
ALBUMIN/GLOB SERPL: 1.7 {RATIO} (ref 1–2)
ALP LIVER SERPL-CCNC: 30 U/L
ALT SERPL-CCNC: 16 U/L
ANION GAP SERPL CALC-SCNC: 15 MMOL/L (ref 0–18)
AST SERPL-CCNC: 13 U/L (ref 15–37)
BASOPHILS # BLD AUTO: 0.02 X10(3) UL (ref 0–0.2)
BASOPHILS # BLD AUTO: 0.03 X10(3) UL (ref 0–0.2)
BASOPHILS NFR BLD AUTO: 0.2 %
BASOPHILS NFR BLD AUTO: 0.2 %
BILIRUB SERPL-MCNC: 1.4 MG/DL (ref 0.1–2)
BUN BLD-MCNC: 9 MG/DL (ref 9–23)
CALCIUM BLD-MCNC: 8.5 MG/DL (ref 8.5–10.1)
CHLORIDE SERPL-SCNC: 108 MMOL/L (ref 98–112)
CO2 SERPL-SCNC: 17 MMOL/L (ref 21–32)
CREAT BLD-MCNC: 0.82 MG/DL
EGFRCR SERPLBLD CKD-EPI 2021: 112 ML/MIN/1.73M2 (ref 60–?)
EOSINOPHIL # BLD AUTO: 0.06 X10(3) UL (ref 0–0.7)
EOSINOPHIL # BLD AUTO: 0.09 X10(3) UL (ref 0–0.7)
EOSINOPHIL NFR BLD AUTO: 0.6 %
EOSINOPHIL NFR BLD AUTO: 0.7 %
ERYTHROCYTE [DISTWIDTH] IN BLOOD BY AUTOMATED COUNT: 14.5 %
ERYTHROCYTE [DISTWIDTH] IN BLOOD BY AUTOMATED COUNT: 14.7 %
GLOBULIN PLAS-MCNC: 2.2 G/DL (ref 2.8–4.4)
GLUCOSE BLD-MCNC: 120 MG/DL (ref 70–99)
GLUCOSE BLD-MCNC: 123 MG/DL (ref 70–99)
GLUCOSE BLD-MCNC: 131 MG/DL (ref 70–99)
GLUCOSE BLD-MCNC: 150 MG/DL (ref 70–99)
GLUCOSE BLD-MCNC: 150 MG/DL (ref 70–99)
HCT VFR BLD AUTO: 37.5 %
HCT VFR BLD AUTO: 42.2 %
HGB BLD-MCNC: 13.4 G/DL
HGB BLD-MCNC: 13.6 G/DL
IMM GRANULOCYTES # BLD AUTO: 0.03 X10(3) UL (ref 0–1)
IMM GRANULOCYTES # BLD AUTO: 0.05 X10(3) UL (ref 0–1)
IMM GRANULOCYTES NFR BLD: 0.3 %
IMM GRANULOCYTES NFR BLD: 0.4 %
LIPASE SERPL-CCNC: 92 U/L (ref ?–300)
LYMPHOCYTES # BLD AUTO: 0.88 X10(3) UL (ref 1–4)
LYMPHOCYTES # BLD AUTO: 0.96 X10(3) UL (ref 1–4)
LYMPHOCYTES NFR BLD AUTO: 7.2 %
LYMPHOCYTES NFR BLD AUTO: 9.1 %
MAGNESIUM SERPL-MCNC: 1.9 MG/DL (ref 1.6–2.6)
MCH RBC QN AUTO: 29.1 PG (ref 26–34)
MCH RBC QN AUTO: 30.2 PG (ref 26–34)
MCHC RBC AUTO-ENTMCNC: 32.2 G/DL (ref 31–37)
MCHC RBC AUTO-ENTMCNC: 35.7 G/DL (ref 31–37)
MCV RBC AUTO: 84.7 FL
MCV RBC AUTO: 90.4 FL
MONOCYTES # BLD AUTO: 0.73 X10(3) UL (ref 0.1–1)
MONOCYTES # BLD AUTO: 0.95 X10(3) UL (ref 0.1–1)
MONOCYTES NFR BLD AUTO: 6.9 %
MONOCYTES NFR BLD AUTO: 7.8 %
NEUTROPHILS # BLD AUTO: 10.23 X10 (3) UL (ref 1.5–7.7)
NEUTROPHILS # BLD AUTO: 10.23 X10(3) UL (ref 1.5–7.7)
NEUTROPHILS # BLD AUTO: 8.76 X10 (3) UL (ref 1.5–7.7)
NEUTROPHILS # BLD AUTO: 8.76 X10(3) UL (ref 1.5–7.7)
NEUTROPHILS NFR BLD AUTO: 82.9 %
NEUTROPHILS NFR BLD AUTO: 83.7 %
OSMOLALITY SERPL CALC.SUM OF ELEC: 292 MOSM/KG (ref 275–295)
PHOSPHATE SERPL-MCNC: 2.7 MG/DL (ref 2.5–4.9)
PLATELET # BLD AUTO: 104 10(3)UL (ref 150–450)
PLATELET # BLD AUTO: 94 10(3)UL (ref 150–450)
POTASSIUM SERPL-SCNC: 3.8 MMOL/L (ref 3.5–5.1)
PROT SERPL-MCNC: 5.9 G/DL (ref 6.4–8.2)
RBC # BLD AUTO: 4.43 X10(6)UL
RBC # BLD AUTO: 4.67 X10(6)UL
SODIUM SERPL-SCNC: 140 MMOL/L (ref 136–145)
TRIGL SERPL-MCNC: 1123 MG/DL (ref 30–149)
TRIGL SERPL-MCNC: 960 MG/DL (ref 30–149)
WBC # BLD AUTO: 10.6 X10(3) UL (ref 4–11)
WBC # BLD AUTO: 12.2 X10(3) UL (ref 4–11)

## 2024-04-17 PROCEDURE — 99232 SBSQ HOSP IP/OBS MODERATE 35: CPT | Performed by: INTERNAL MEDICINE

## 2024-04-17 RX ORDER — PANTOPRAZOLE SODIUM 40 MG/1
40 TABLET, DELAYED RELEASE ORAL
Status: DISCONTINUED | OUTPATIENT
Start: 2024-04-18 | End: 2024-04-18

## 2024-04-17 RX ORDER — FENOFIBRATE 134 MG/1
134 CAPSULE ORAL
Status: DISCONTINUED | OUTPATIENT
Start: 2024-04-18 | End: 2024-04-18

## 2024-04-17 RX ORDER — EMPAGLIFLOZIN 25 MG/1
25 TABLET, FILM COATED ORAL DAILY
COMMUNITY

## 2024-04-17 RX ORDER — DIPHENHYDRAMINE HCL 25 MG
25 CAPSULE ORAL ONCE
Status: COMPLETED | OUTPATIENT
Start: 2024-04-17 | End: 2024-04-17

## 2024-04-17 RX ORDER — DIPHENHYDRAMINE HYDROCHLORIDE 50 MG/ML
25 INJECTION INTRAMUSCULAR; INTRAVENOUS ONCE
Status: COMPLETED | OUTPATIENT
Start: 2024-04-17 | End: 2024-04-17

## 2024-04-17 RX ORDER — ICOSAPENT ETHYL 1000 MG/1
2 CAPSULE ORAL 2 TIMES DAILY
Status: DISCONTINUED | OUTPATIENT
Start: 2024-04-17 | End: 2024-04-18

## 2024-04-17 RX ORDER — ROSUVASTATIN CALCIUM 40 MG/1
40 TABLET, COATED ORAL NIGHTLY
COMMUNITY

## 2024-04-17 RX ORDER — CALCIUM CARBONATE 500 MG/1
500 TABLET, CHEWABLE ORAL 3 TIMES DAILY PRN
Status: DISCONTINUED | OUTPATIENT
Start: 2024-04-17 | End: 2024-04-18

## 2024-04-17 RX ORDER — FENOFIBRATE 160 MG/1
160 TABLET ORAL DAILY
COMMUNITY

## 2024-04-17 RX ORDER — ALBUMIN, HUMAN INJ 5% 5 %
3000 SOLUTION INTRAVENOUS ONCE
Status: COMPLETED | OUTPATIENT
Start: 2024-04-17 | End: 2024-04-17

## 2024-04-17 RX ORDER — ICOSAPENT ETHYL 1000 MG/1
2 CAPSULE ORAL 2 TIMES DAILY
COMMUNITY

## 2024-04-17 RX ORDER — ESOMEPRAZOLE MAGNESIUM 40 MG/1
40 CAPSULE, DELAYED RELEASE ORAL
COMMUNITY

## 2024-04-17 RX ORDER — GLIPIZIDE 2.5 MG/1
2.5 TABLET, EXTENDED RELEASE ORAL
COMMUNITY

## 2024-04-17 RX ORDER — FAMOTIDINE 10 MG/ML
20 INJECTION, SOLUTION INTRAVENOUS 2 TIMES DAILY
Status: DISCONTINUED | OUTPATIENT
Start: 2024-04-17 | End: 2024-04-17 | Stop reason: ALTCHOICE

## 2024-04-17 RX ORDER — ROSUVASTATIN CALCIUM 20 MG/1
40 TABLET, COATED ORAL NIGHTLY
Status: DISCONTINUED | OUTPATIENT
Start: 2024-04-17 | End: 2024-04-18

## 2024-04-17 RX ORDER — FAMOTIDINE 20 MG/1
20 TABLET, FILM COATED ORAL 2 TIMES DAILY
Status: DISCONTINUED | OUTPATIENT
Start: 2024-04-17 | End: 2024-04-17 | Stop reason: ALTCHOICE

## 2024-04-17 NOTE — PROGRESS NOTES
[facility]   part of [facility]     Hospitalist Progress Note     Ramón Nunez Patient Status:  Inpatient    10/10/1980 MRN [id number]   Location [facility] 4NW-A Attending Chloe Dixon MD   Hosp Day # 1 PCP Uday Betancourt MD     Chief Complaint: Abdominal pain    Subjective:     Abdominal pain improving 1/10 now  No nausea vomiting.  Afebrile.  Remains on room air.    Objective:    Review of Systems:   A comprehensive review of systems was completed; pertinent positive and negatives stated in subjective.    Vital signs:  Temp:  [98.1 °F (36.7 °C)] 98.1 °F (36.7 °C)  Pulse:  [110-115] 115  Resp:  [18] 18  BP: (126-127)/(70-75) 127/70  SpO2:  [95 %-96 %] 96 %    Physical Exam:    /70 (BP Location: Right arm)   Pulse 115   Temp 98.1 °F (36.7 °C) (Temporal)   Resp 18   Ht 5' 8\" (1.727 m)   Wt 216 lb 6.4 oz (98.2 kg)   SpO2 96%   BMI 32.90 kg/m²     General: No acute distress  Respiratory: Clear to auscultation bilateral, no wheezes, no rhonchi  Cardiovascular: S1, S2, regular rate and rhythm  Abdomen: Soft, Non-tender, non-distended, positive bowel sounds  Neuro: No new focal deficits.   Extremities: No pedal edema  No calf tenderness    Diagnostic Data:    Labs:  Recent Labs   Lab 04/15/24  2004 04/17/24  0603   WBC 9.9 10.6   HGB 16.6 13.4   MCV 81.3 84.7   .0 94.0*       Recent Labs   Lab 04/15/24  2004 04/15/24  2143 24  0603   *  --  150*   BUN 8*  --  9   CREATSERUM 0.98  --  0.82   CA  --  8.3* 8.5   ALB 3.7  --  3.7   *  --  140   K  --   --  3.8   CL 99  --  108   CO2 21.0  --  17.0*   ALKPHO 69  --  30*   AST  --   --  13*   ALT 40  --  16   BILT 0.8  --  1.4   TP 8.0  --  5.9*       Estimated Creatinine Clearance: 112.4 mL/min (based on SCr of 0.82 mg/dL).    No results for input(s): \"TROP\", \"TROPHS\", \"CK\" in the last 168 hours.    No results for input(s): \"PTP\", \"INR\" in the last 168 hours.               Microbiology    No results found for this  visit on 04/15/24.      Imaging: Reviewed in Epic.    Medications:    enoxaparin  40 mg Subcutaneous Daily    insulin aspart  1-10 Units Subcutaneous TID AC and HS    insulin degludec  10 Units Subcutaneous Daily       Assessment & Plan:      # Acute pancreatitis due to hypertriglyceridemia  -Lipid panel showed marked hypertriglyceridemia with triglycerides more than 5000 on admission, improving with plasmapheresis started by nephrology  -Nephrology on consult  -Lipase elevated on admission  -clear liq diet to be started  -Continue IV fluids  -Continue IV pain medication as needed.     # Familial hypertriglyceridemia  -Patient reportedly on a drug trial, patient supposedly to find out in the next week or so if he was actually getting the medication versus placebo.  -Seen by nephrologist who started patient on plasmapheresis for triglycerides more than 5000.  Right internal jugular temporary hemodialysis catheter placed by interventional radiologist Dr. Gurinder Almonte on 4/16/2024 for the same     # Diabetes mellitus type 2 with hyperglycemia on admission  -Hyperglycemia protocol  -Hemoglobin A1c of 9.3  -On long-acting insulin degludec  -On NovoLog correction factor insulin coverage as needed  -Will add NovoLog carb counting also once started on oral diet  -Follow Accu-Cheks    # Pseudohyponatremia on admission due to hyperglycemia  -Blood sugar improving and sodium back to normal    # Thrombocytopenia  -Repeat CBC     Discussed with patient,wife, dad, mom at bedside. discussed with RN    Chloe Dixon MD    Supplementary Documentation:     Quality:  DVT Mechanical Prophylaxis:   SCDs,    DVT Pharmacologic Prophylaxis   Medication    enoxaparin (Lovenox) 40 MG/0.4ML SUBQ injection 40 mg                Code Status: Not on file  Dolan: No urinary catheter in place  Dolan Duration (in days):   Central line:    VIANEY:     Discharge is dependent on: Clinical progress  At this point Mr. Nunez is expected to be discharge to:  Home    The 21st Century Cures Act makes medical notes like these available to patients in the interest of transparency. Please be advised this is a medical document. Medical documents are intended to carry relevant information, facts as evident, and the clinical opinion of the practitioner. The medical note is intended as peer to peer communication and may appear blunt or direct. It is written in medical language and may contain abbreviations or verbiage that are unfamiliar.

## 2024-04-17 NOTE — PROCEDURES
[facility]   part of [facility]  Procedure Note    Ramón Nunez Patient Status:  Inpatient    10/10/1980 MRN [id number]   Location [facility] INTERVENTIONAL SUITES Attending Woody Valdovinos MD    Day # 0 PCP Uday Betancourt MD     Procedure: right internal jugular temporary HD catheter placement    Pre-Procedure Diagnosis:  high triglycerides, pancreatitis    Post-Procedure Diagnosis: same    Anesthesia:  Local    Findings:  tip at SVC/RA junction, good blood return both lumens    Specimens: none    Blood Loss:  <10 ml    Tourniquet Time: n/a  Complications:  None  Drains:  none    Secondary Diagnosis:  none    Gurinder Saini MD  2024

## 2024-04-17 NOTE — PAYOR COMM NOTE
--------------  ADMISSION REVIEW     Payor: [employer] OPEN [employer]ESS   Subscriber #:  [id number]  Authorization Number: [id number]    Admit date: 4/16/24  Admit time:  6:06 AM       REVIEW DOCUMENTATION:      Patient Seen in: [facility] Emergency Department      History     Chief Complaint   Patient presents with    Abdomen/Flank Pain     Stated Complaint: LOWER ABD PAIN, NO FEVER, NO N/V/D    Subjective:   HPI    44 yo with pain.  It feels like his pancreatitis.  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  He has a familial history of hypertriglyceridemia it was found incidentally when the patient was started on Accutane and they were trying to follow his LFTs but they could not result in the lab.  Since that time patient has been treated for his high triglycerides but at times his triglycerides spike and he gets pancreatitis.  He occasionally drinks beer because he is a  but it is not excessive.  He does and has in the past seen cardiology and his primary care physician he does take fenofibrate he does take a statin as well as fish oil but his triglycerides have remained really high and states that he very frequently has difficulty when he gets blood drawn he is recalled because blood cannot be interpreted.  He is again having an increase in abdominal pain so comes to the emergency department for treatment and evaluation.      Physical Exam     ED Triage Vitals [04/15/24 1955]   BP (!) 145/92   Pulse 103   Resp 18   Temp 97.7 °F (36.5 °C)   Temp src Temporal   SpO2 96 %   O2 Device None (Room air)       Current:/86   Pulse 102   Temp 97.7 °F (36.5 °C) (Temporal)   Resp 18   Ht 172.7 cm (5' 8\")   Wt 97.5 kg   SpO2 94%   BMI 32.69 kg/m²         Physical Exam  HENT:      Head: Atraumatic.      Right Ear: External ear normal.      Left Ear: External ear normal.      Nose: Nose normal.      Mouth/Throat:      Comments: Very dry oral  mucosa  Abdominal:      General: Bowel sounds are normal. There is no distension.      Palpations: Abdomen is soft.      Tenderness: There is abdominal tenderness in the right upper quadrant, epigastric area and left upper quadrant. There is no guarding or rebound.         ED Course     Labs Reviewed   COMP METABOLIC PANEL (14) - Abnormal; Notable for the following components:       Result Value    Glucose 250 (*)     Sodium 131 (*)     BUN 8 (*)     A/G Ratio 0.9 (*)     All other components within normal limits   LIPASE - Abnormal; Notable for the following components:    Lipase 718 (*)     All other components within normal limits   CALCIUM - Abnormal; Notable for the following components:    Calcium, Total 8.3 (*)     All other components within normal limits   POCT GLUCOSE - Abnormal; Notable for the following components:    POC Glucose 241 (*)     All other components within normal limits   CBC W/ DIFFERENTIAL - Abnormal; Notable for the following components:    HCT 37.9 (*)     MCH 35.6 (*)     MCHC 43.8 (*)     Neutrophil Absolute Prelim 7.82 (*)     Neutrophil Absolute 7.82 (*)       Time: 04/15 2137  Value: Lipase(!!): 718     CT ABDOMEN+PELVIS(CONTRAST ONLY)(CPT=74177)   Final Result   PROCEDURE:  CT ABDOMEN+PELVIS (CONTRAST ONLY) (CPT=74177)       COMPARISON:  [place], CT, CT ABDOMEN+PELVIS [facility] 2D RNDR(NO    IV,NO ORAL)(CPT=74176), 2/05/2024, 7:54 PM.       INDICATIONS:  LOWER ABD PAIN, NO FEVER, NO N/V/D       TECHNIQUE:  CT scanning was performed from the dome of the diaphragm to    the pubic symphysis with non-ionic intravenous contrast material. Post    contrast coronal MPR imaging was performed.  Dose reduction techniques    were used. Dose information is    transmitted to the ACR (American College of Radiology) NRDR (National    Radiology Data Registry) which includes the Dose Index Registry.       PATIENT STATED HISTORY:(As transcribed by Technologist)  lower abd pain        CONTRAST  USED:  100 mLcc of Isovue 370       FINDINGS:     LIVER:  No enlargement, atrophy, abnormal density, or significant focal    lesion.     BILIARY:  No visible dilatation or calcification.     PANCREAS:  Inflammatory changes involving the pancreatic head and uncinate    process extend around the duodenum.   SPLEEN:  Borderline enlarged spleen.   KIDNEYS:  No mass, obstruction, or calcification.     ADRENALS:  No mass or enlargement.     AORTA/VASCULAR:  No aneurysm or dissection.     RETROPERITONEUM:  No mass or adenopathy.     BOWEL/MESENTERY:  No visible mass, obstruction, or bowel wall thickening.     The appendix is normal.   ABDOMINAL WALL:  No mass or hernia.     URINARY BLADDER:  No visible focal wall thickening, lesion, or calculus.     PELVIC NODES:  No adenopathy.     PELVIC ORGANS:  No visible mass.  Pelvic organs appropriate for patient    age.     BONES:  No bony lesion or fracture.     LUNG BASES:  No visible pulmonary or pleural disease.     OTHER:  Negative.                           =====   CONCLUSION:         Inflammatory changes involving the pancreatic head and uncinate process is    also extend around the duodenum.  This is likely sequelae of acute    pancreatitis.  Sequelae of duodenitis is of consideration as well.           LOCATION:  [place]           Dictated by (CST): Kb Workman MD on 4/15/2024 at 11:00 PM        Finalized by (CST): Kb Workman MD on 4/15/2024 at 11:02 PM         CT done to rule out pancreatic pseudocyst do suspect the patient has acute pancreatitis I do not see signs of necrotizing pancreatitis.      Medical Decision Making      Disposition and Plan     Clinical Impression:  1. Hypertriglyceridemia, familial    2. Other acute pancreatitis without infection or necrosis (HCC)    3. Hyperglycemia           [facility]     Ramón VAZQUEZ Enrique Patient Status:  Emergency    10/10/1980 MRN [id number]   Location [facility] EMERGENCY  DEPARTMENT Attending Tati Downs MD   Hosp Day # 0 PCP Uday Betancourt MD     Chief Complaint: Abdominal pain    Subjective:    History of Present Illness:     Ramón Nunez is a 43 year old male with history of familial hypertriglyceridemia, type 2 diabetes presents to the emergency room with increased abdominal pain similar to previous episodes of pancreatitis.  Patient has history of familial hypertriglyceridemia was started on Accutane..  Patient reportedly is on a drug trial through [facility].  Patient denies any nausea, vomiting, diarrhea or constipation.  No chest pain, shortness of breath, dizziness, lightheadedness, or syncope.    History/Other:    Past Medical History:  Past Medical History:    Acute pancreatitis (HCC)    Diabetes (HCC)    Elevated liver function tests    Glucose intolerance    HYPERTRIGLYCERIDEMIA    SEASONAL ALLERGIES     Past Surgical History:   Past Surgical History:   Procedure Laterality Date    Back surgery        Family History:   Family History   Problem Relation Age of Onset    Heart Disease Paternal Grandmother         CONGESTIVE HEART FAILURE    Lipids Paternal Grandmother     Diabetes Paternal Grandmother     Neurological Disorder Paternal Grandfather         alzheimer    Heart Disease Paternal Grandfather     Cancer Paternal Grandfather         skin    Stroke Paternal Grandfather     High Cholesterol Father     Lipids Father     Diabetes Father     Obesity Father     Gastro-Intestinal Disorder Father         GERD, diverticulosis    Arthritis Maternal Grandmother         SLE    High Cholesterol Maternal Grandmother     Hypertension Maternal Grandmother     Heart Surgery Maternal Grandmother         CABG    Heart Disease Maternal Grandmother     Allergies Sister     Neurological Disorder Sister     Arthritis Maternal Grandfather         TKA     Social History:    reports that he has never smoked. He has never used smokeless tobacco. He reports current alcohol use of about  5.0 standard drinks of alcohol per week. He reports that he does not use drugs.       Assessment & Plan:      # Acute pancreatitis  -Secondary to hypertriglyceridemia  -Lipid panel pending  -Nephrology on consult  -Strict n.p.o.  -Continue IV fluids  -Continue IV pain medication as needed.    # Hypertriglyceridemia  -Patient reportedly on a drug trial  -Patient supposedly to find out in the next week or so if he was actually getting the medication versus placebo.    # Type 2 diabetes  -Placed on hypoglycemia protocol with correction factor insulin.    Plan of care discussed with patient at bedside.    Levi Alex, DO      Nursing        04/15/24 1955 04/15/24 2145 04/15/24 2153   Pain Assessment: Ongoing & Relief (q4h & relief)   Pain Assessment Tool 0-10 0-10 0-10   0-10 Scale 8 (severe pain) 10 (severe pain) 10 (severe pain)   Pain Location Abdomen Abdomen  --    Pain Intervention(s)  --   --  Medication;Non-Pharmacologic           4/16 GI    Reason for Consultation:  Hypertriglyceridemia  Pancreatitis - acute      History of Present Illness:  Ramón Nunez is a a(n) 43 year old male with hx of DM, familial hypertriglyceridemia , who presnets to the hospital w/ complain of abdominal pain.   He has has prior episodes of acute pancreatitis related to TGs     Elevated lipase 718  A1C9.3  TG>5000     CT findings c/w with acute pancreatitis       Imaging:  Impression   CONCLUSION:       Inflammatory changes involving the pancreatic head and uncinate process is also extend around the duodenum.  This is likely sequelae of acute pancreatitis.  Sequelae of duodenitis is of consideration as well      Impression:  Acute pancreatitis related to hypertriglyceridemia  (reportedly familial)  - fluids/NPO  - pain control  - discussed role of PLEX w/ patient- risk/benefits reviewed- he wants to think about it and discuss w/ wife. Will let us know if he wants to proceed  DM; poor control; A1C 9.3  Obesity   HTN -  controlled; monitor   GERD     Care reviewed w/ pt and RN     Thank you for allowing me to participate in this patient's care.  Please feel free to call me with any questions or concerns.     Milan Iniguez MD      4/17 IR    Procedure: right internal jugular temporary HD catheter placement     Pre-Procedure Diagnosis:  high triglycerides, pancreatitis     Post-Procedure Diagnosis: same     Anesthesia:  Local     Findings:  tip at SVC/RA junction, good blood return both lumens     Nursing  Patient alert x4, VSS. Complaint of abd discomfort. Morphine given with moderate relief. Temp cath placed in left jugular for plasmapheresis. Dialysis RN came completed plasmapheresis with pt w/o complications.       Impression:  Acute pancreatitis related to hypertriglyceridemia  (reportedly familial)  - fluids/NPO  - pain control  - await TG level- goal TG <500 - may need additional plasmapheresis tx   DM; poor control; A1C 9.3  Obesity   HTN - controlled; monitor   GERD    GI  Recent Labs     04/15/24  2004 04/17/24  0603   WBC 9.9 10.6   HGB 16.6 13.4   MCV 81.3 84.7   .0 94.0*              Recent Labs     04/15/24  2004 04/15/24  2143   *  --    CL 99  --    CO2 21.0  --    BUN 8*  --    CREATSERUM 0.98  --    CA  --  8.3*             Recent Labs     04/15/24  2004   ALT 40   ALB 3.7       Latest Reference Range & Units 04/15/24 21:43 04/17/24 06:03   Triglycerides 30 - 149 mg/dL >5,000 (H) 1,123 (H)   (H): Data is abnormally high     Latest Reference Range & Units 04/15/24 20:04 04/17/24 11:10   Lipase <=300 U/L 718 (HH) 92   (HH): Data is critically high  MEDICATIONS ADMINISTERED IN LAST 1 DAY:  albumin human (Albumin) 5% injection 3,000 mL       Date Action Dose Route User    4/16/2024 2309 New Bag 3,000 mL Intravenous Roz Nieves RN          diphenhydrAMINE (Benadryl) 50 mg/mL  injection 25 mg       Date Action Dose Route User    4/16/2024 2308 Given 25 mg Intravenous Roz Nieves RN           insulin aspart (NovoLOG) 100 Units/mL FlexPen 1-10 Units       Date Action Dose Route User    4/16/2024 1250 Given 1 Units Subcutaneous (Left Upper Arm) Michael Alexis RN          insulin degludec 100 units/mL flextouch 10 Units       Date Action Dose Route User    4/17/2024 1104 Given 10 Units Subcutaneous (Left Upper Arm) Leticia Mendez RN          calcium gluconate 3 g in sodium chloride 0.9% 250 mL IVPB       Date Action Dose Route User    4/16/2024 2309 Given (none) Intravenous Roz Nieves RN          lactated ringers infusion       Date Action Dose Route User    4/17/2024 0550 New Bag (none) Intravenous Roz Nieves RN    4/16/2024 2100 New Bag (none) Intravenous Roz Nieves RN    4/16/2024 1251 New Bag (none) Intravenous Michael Alexis RN          morphINE PF 4 MG/ML injection 1 mg       Date Action Dose Route User    4/17/2024 0528 Given 1 mg Intravenous Roz Nieves RN    4/16/2024 2214 Given 1 mg Intravenous Roz Nieves RN            Vitals (last day)       Date/Time Temp Pulse Resp BP SpO2 Weight O2 Device O2 Flow Rate (L/min) [facility]    04/16/24 1755 98.1 °F (36.7 °C) 115 18 127/70 96 % -- None (Room air) -- LV    04/16/24 1200 98.1 °F (36.7 °C) 110 18 126/75 95 % -- None (Room air) 0 L/min ER    04/16/24 0703 -- -- -- -- -- 216 lb 6.4 oz -- -- LM    04/16/24 0617 -- 112 -- 144/85 97 % -- -- -- LM    04/16/24 0445 -- 103 -- 137/91 -- -- None (Room air) --     04/16/24 0100 -- 90 18 139/81 100 % -- -- -- MM

## 2024-04-17 NOTE — PLAN OF CARE
Pt alert/oriented x 4.  Abdomen soft but tender, active bowel sounds.  NPO during day shift.  Triglycerides 1123.  Pt received PLEX treatment, tolerated well.  Ok to start clear liquids this evening and advance as tolerated per Dr. Dixon.  States \"having acid reflux.\"  Pepcid IV given with adequate control of symptom.  Blood sugars stable.  IVF infusing at 125cc/hr.  Updated pt re: plan of care, verbalizes understanding.  Safety measures in place.     Problem: Diabetes/Glucose Control  Goal: Glucose maintained within prescribed range  Description: INTERVENTIONS:  - Monitor Blood Glucose as ordered  - Assess for signs and symptoms of hyperglycemia and hypoglycemia  - Administer ordered medications to maintain glucose within target range  - Assess barriers to adequate nutritional intake and initiate nutrition consult as needed  - Instruct patient on self management of diabetes  4/17/2024 1853 by Leticia Mendez, RN  Outcome: Progressing  4/17/2024 1853 by Leticia Mendez, RN  Outcome: Progressing

## 2024-04-17 NOTE — PROGRESS NOTES
[facility]  Progress Note    Ramón Nunez Patient Status:  Inpatient    10/10/1980 MRN [id number]   [facility] 0SW-A Attending Woody Valdovinos MD   Hosp Day # 1 PCP dUay Betancourt MD       Feels better today; pain improved  No n/v      Current Facility-Administered Medications:     glucose (Dex4) 15 GM/59ML oral liquid 15 g, 15 g, Oral, Q15 Min PRN **OR** glucose (Glutose) 40% oral gel 15 g, 15 g, Oral, Q15 Min PRN **OR** glucose-vitamin C (Dex-4) chewable tab 4 tablet, 4 tablet, Oral, Q15 Min PRN **OR** dextrose 50% injection 50 mL, 50 mL, Intravenous, Q15 Min PRN **OR** glucose (Dex4) 15 GM/59ML oral liquid 30 g, 30 g, Oral, Q15 Min PRN **OR** glucose (Glutose) 40% oral gel 30 g, 30 g, Oral, Q15 Min PRN **OR** glucose-vitamin C (Dex-4) chewable tab 8 tablet, 8 tablet, Oral, Q15 Min PRN    melatonin tab 3 mg, 3 mg, Oral, Nightly PRN    lactated ringers infusion, , Intravenous, Continuous    enoxaparin (Lovenox) 40 MG/0.4ML SUBQ injection 40 mg, 40 mg, Subcutaneous, Daily    acetaminophen (Tylenol Extra Strength) tab 500 mg, 500 mg, Oral, Q4H PRN    morphINE PF 4 MG/ML injection 1 mg, 1 mg, Intravenous, Q2H PRN **OR** morphINE PF 4 MG/ML injection 2 mg, 2 mg, Intravenous, Q2H PRN **OR** morphINE PF 4 MG/ML injection 4 mg, 4 mg, Intravenous, Q2H PRN    ondansetron (Zofran) 4 MG/2ML injection 4 mg, 4 mg, Intravenous, Q6H PRN    prochlorperazine (Compazine) 10 MG/2ML injection 5 mg, 5 mg, Intravenous, Q8H PRN    insulin aspart (NovoLOG) 100 Units/mL FlexPen 1-10 Units, 1-10 Units, Subcutaneous, TID AC and HS    insulin degludec 100 units/mL flextouch 10 Units, 10 Units, Subcutaneous, Daily         Physical Exam:  Vital signs: Blood pressure 127/70, pulse 115, temperature 98.1 °F (36.7 °C), temperature source Temporal, resp. rate 18, height 5' 8\" (1.727 m), weight 216 lb 6.4 oz (98.2 kg), SpO2 96%.  General: No acute distress. Alert and oriented x 3.  HEENT: Moist mucous membranes. EOM-I.  PERRL  Neck: No lymphadenopathy.  No JVD. No carotid bruits.  Respiratory: Clear to auscultation bilaterally.  No wheezes. No rhonchi.  Cardiovascular: S1, S2.  Regular rate and rhythm.  No murmurs. Equal pulses   Abdomen: Soft, ND:+ abd pain.    Neurologic: No focal neurological deficits.   Musculoskeletal: Full range of motion of all extremities.  No swelling noted.   Integument: No lesions. No erythema.  Psychiatric: Appropriate mood and affect.  Recent Labs     04/15/24  2004 04/17/24  0603   WBC 9.9 10.6   HGB 16.6 13.4   MCV 81.3 84.7   .0 94.0*       Recent Labs     04/15/24  2004 04/15/24  2143   *  --    CL 99  --    CO2 21.0  --    BUN 8*  --    CREATSERUM 0.98  --    CA  --  8.3*       Recent Labs     04/15/24  2004   ALT 40   ALB 3.7       Await TG level    Imaging:  Impression   CONCLUSION:       Inflammatory changes involving the pancreatic head and uncinate process is also extend around the duodenum.  This is likely sequelae of acute pancreatitis.  Sequelae of duodenitis is of consideration as well     Impression:  Acute pancreatitis related to hypertriglyceridemia  (reportedly familial)  - fluids/NPO  - pain control  - await TG level- goal TG <500 - may need additional plasmapheresis tx   DM; poor control; A1C 9.3  Obesity   HTN - controlled; monitor   GERD    Care reviewed w/ pt and RN    Thank you for allowing me to participate in this patient's care.  Please feel free to call me with any questions or concerns.    Milan Iniguez MD  4/17/2024

## 2024-04-17 NOTE — PROGRESS NOTES
Patient alert x4, VSS. Complaint of abd discomfort. Morphine given with moderate relief. Temp cath placed in left jugular for plasmapheresis. Dialysis RN came completed plasmapheresis with pt w/o complications. Ambulates self to bathroom. Temp cath dressing clean, and intact. Call light within reach.

## 2024-04-18 VITALS
RESPIRATION RATE: 16 BRPM | HEIGHT: 68 IN | WEIGHT: 216.38 LBS | DIASTOLIC BLOOD PRESSURE: 75 MMHG | SYSTOLIC BLOOD PRESSURE: 135 MMHG | TEMPERATURE: 99 F | HEART RATE: 103 BPM | OXYGEN SATURATION: 99 % | BODY MASS INDEX: 32.79 KG/M2

## 2024-04-18 LAB
ALBUMIN SERPL-MCNC: 3.7 G/DL (ref 3.4–5)
ALBUMIN/GLOB SERPL: 1.5 {RATIO} (ref 1–2)
ALP LIVER SERPL-CCNC: 32 U/L
ALT SERPL-CCNC: 8 U/L
ANION GAP SERPL CALC-SCNC: 12 MMOL/L (ref 0–18)
AST SERPL-CCNC: 7 U/L (ref 15–37)
BASOPHILS # BLD AUTO: 0.02 X10(3) UL (ref 0–0.2)
BASOPHILS NFR BLD AUTO: 0.2 %
BILIRUB SERPL-MCNC: 1 MG/DL (ref 0.1–2)
BUN BLD-MCNC: 7 MG/DL (ref 9–23)
CALCIUM BLD-MCNC: 8.4 MG/DL (ref 8.5–10.1)
CHLORIDE SERPL-SCNC: 103 MMOL/L (ref 98–112)
CO2 SERPL-SCNC: 21 MMOL/L (ref 21–32)
CREAT BLD-MCNC: 0.66 MG/DL
EGFRCR SERPLBLD CKD-EPI 2021: 119 ML/MIN/1.73M2 (ref 60–?)
EOSINOPHIL # BLD AUTO: 0.23 X10(3) UL (ref 0–0.7)
EOSINOPHIL NFR BLD AUTO: 2 %
ERYTHROCYTE [DISTWIDTH] IN BLOOD BY AUTOMATED COUNT: 14.8 %
GLOBULIN PLAS-MCNC: 2.5 G/DL (ref 2.8–4.4)
GLUCOSE BLD-MCNC: 128 MG/DL (ref 70–99)
GLUCOSE BLD-MCNC: 132 MG/DL (ref 70–99)
GLUCOSE BLD-MCNC: 135 MG/DL (ref 70–99)
HCT VFR BLD AUTO: 36.7 %
HGB BLD-MCNC: 12.6 G/DL
IMM GRANULOCYTES # BLD AUTO: 0.06 X10(3) UL (ref 0–1)
IMM GRANULOCYTES NFR BLD: 0.5 %
LIPASE SERPL-CCNC: 57 U/L (ref ?–300)
LYMPHOCYTES # BLD AUTO: 1.05 X10(3) UL (ref 1–4)
LYMPHOCYTES NFR BLD AUTO: 9.1 %
MAGNESIUM SERPL-MCNC: 1.9 MG/DL (ref 1.6–2.6)
MCH RBC QN AUTO: 29.2 PG (ref 26–34)
MCHC RBC AUTO-ENTMCNC: 34.3 G/DL (ref 31–37)
MCV RBC AUTO: 85.2 FL
MONOCYTES # BLD AUTO: 0.97 X10(3) UL (ref 0.1–1)
MONOCYTES NFR BLD AUTO: 8.4 %
NEUTROPHILS # BLD AUTO: 9.22 X10 (3) UL (ref 1.5–7.7)
NEUTROPHILS # BLD AUTO: 9.22 X10(3) UL (ref 1.5–7.7)
NEUTROPHILS NFR BLD AUTO: 79.8 %
OSMOLALITY SERPL CALC.SUM OF ELEC: 282 MOSM/KG (ref 275–295)
PLATELET # BLD AUTO: 104 10(3)UL (ref 150–450)
POTASSIUM SERPL-SCNC: 3.5 MMOL/L (ref 3.5–5.1)
PROT SERPL-MCNC: 6.2 G/DL (ref 6.4–8.2)
RBC # BLD AUTO: 4.31 X10(6)UL
SODIUM SERPL-SCNC: 136 MMOL/L (ref 136–145)
TRIGL SERPL-MCNC: 276 MG/DL (ref 30–149)
WBC # BLD AUTO: 11.6 X10(3) UL (ref 4–11)

## 2024-04-18 PROCEDURE — 99239 HOSP IP/OBS DSCHRG MGMT >30: CPT | Performed by: INTERNAL MEDICINE

## 2024-04-18 NOTE — PLAN OF CARE
NURSING DISCHARGE NOTE    Discharged Home via Ambulatory.  Accompanied by Family member  Belongings Taken by patient/family.    Pt received A&Ox4. Afebrile. VSS. Denies abd pain, n/v/d. States still has poor to fair appetite. Mild discomfort to rt neck from temp cath. Cleared to dc per nephro and remove temp cath. Pressure dressing applied, c/d/i. No signs of bleeding. Tolerating soft diet. Dc instructions given to pt at bedside. Verbalized understanding. PIV removed.    [id number]

## 2024-04-18 NOTE — PROGRESS NOTES
[facility]  Progress Note    Ramón Nunez Patient Status:  Inpatient    10/10/1980 MRN [id number]   Location [facility] 0SW-A Attending Woody Valdovinos MD   Hosp Day # 2 PCP Uday Betancourt MD       No complains        Current Facility-Administered Medications:     calcium carbonate (Tums) chewable tab 500 mg, 500 mg, Oral, TID PRN    pantoprazole (Protonix) DR tab 40 mg, 40 mg, Oral, QAM AC    fenofibrate micronized (Lofibra) cap 134 mg, 134 mg, Oral, Daily with breakfast    [Held by provider] Icosapent Ethyl CAPS 2 g - Patient Supplied, 2 g, Oral, BID    rosuvastatin (Crestor) tab 40 mg, 40 mg, Oral, Nightly    glucose (Dex4) 15 GM/59ML oral liquid 15 g, 15 g, Oral, Q15 Min PRN **OR** glucose (Glutose) 40% oral gel 15 g, 15 g, Oral, Q15 Min PRN **OR** glucose-vitamin C (Dex-4) chewable tab 4 tablet, 4 tablet, Oral, Q15 Min PRN **OR** dextrose 50% injection 50 mL, 50 mL, Intravenous, Q15 Min PRN **OR** glucose (Dex4) 15 GM/59ML oral liquid 30 g, 30 g, Oral, Q15 Min PRN **OR** glucose (Glutose) 40% oral gel 30 g, 30 g, Oral, Q15 Min PRN **OR** glucose-vitamin C (Dex-4) chewable tab 8 tablet, 8 tablet, Oral, Q15 Min PRN    melatonin tab 3 mg, 3 mg, Oral, Nightly PRN    lactated ringers infusion, , Intravenous, Continuous    enoxaparin (Lovenox) 40 MG/0.4ML SUBQ injection 40 mg, 40 mg, Subcutaneous, Daily    acetaminophen (Tylenol Extra Strength) tab 500 mg, 500 mg, Oral, Q4H PRN    morphINE PF 4 MG/ML injection 1 mg, 1 mg, Intravenous, Q2H PRN **OR** morphINE PF 4 MG/ML injection 2 mg, 2 mg, Intravenous, Q2H PRN **OR** morphINE PF 4 MG/ML injection 4 mg, 4 mg, Intravenous, Q2H PRN    ondansetron (Zofran) 4 MG/2ML injection 4 mg, 4 mg, Intravenous, Q6H PRN    prochlorperazine (Compazine) 10 MG/2ML injection 5 mg, 5 mg, Intravenous, Q8H PRN    insulin aspart (NovoLOG) 100 Units/mL FlexPen 1-10 Units, 1-10 Units, Subcutaneous, TID AC and HS    insulin degludec 100 units/mL flextouch 10 Units, 10 Units,  Subcutaneous, Daily         Physical Exam:  Vital signs: Blood pressure 120/74, pulse 101, temperature 99.6 °F (37.6 °C), temperature source Oral, resp. rate 15, height 5' 8\" (1.727 m), weight 216 lb 6.4 oz (98.2 kg), SpO2 98%.  General: No acute distress. Alert and oriented x 3.  HEENT: Moist mucous membranes. EOM-I. PERRL  Neck: No lymphadenopathy.  No JVD. No carotid bruits.  Respiratory: Clear to auscultation bilaterally.  No wheezes. No rhonchi.  Cardiovascular: S1, S2.  Regular rate and rhythm.  No murmurs. Equal pulses   Abdomen: Soft, ND:+ abd pain.    Neurologic: No focal neurological deficits.   Musculoskeletal: Full range of motion of all extremities.  No swelling noted.   Integument: No lesions. No erythema.  Psychiatric: Appropriate mood and affect.    Recent Labs     04/15/24  2004 04/17/24  0603 04/17/24  1002 04/18/24  0617   WBC 9.9 10.6 12.2* 11.6*   HGB 16.6 13.4 13.6 12.6*   MCV 81.3 84.7 90.4 85.2   .0 94.0* 104.0* 104.0*       Recent Labs     04/15/24  2004 04/15/24  2143 04/17/24 0603 04/17/24  1110 04/18/24  0617   *  --  140  --  136   K  --   --  3.8  --  3.5   CL 99  --  108  --  103   CO2 21.0  --  17.0*  --  21.0   BUN 8*  --  9  --  7*   CREATSERUM 0.98  --  0.82  --  0.66*   CA  --  8.3* 8.5  --  8.4*   MG  --   --   --  1.9 1.9   PHOS  --   --   --  2.7  --        Recent Labs     04/15/24  2004 04/17/24  0603 04/18/24  0617   ALT 40 16 8*   AST  --  13* 7*   ALB 3.7 3.7 3.7       TG <300     Imaging:  Impression   CONCLUSION:       Inflammatory changes involving the pancreatic head and uncinate process is also extend around the duodenum.  This is likely sequelae of acute pancreatitis.  Sequelae of duodenitis is of consideration as well     Impression:  Acute pancreatitis related to hypertriglyceridemia  (reportedly familial)  Improved  - per primary service   - no additional PLEX planned; OK to dc HD catheter  DM; poor control; A1C 9.3  Obesity   HTN - controlled;  monitor   GERD    Will sign off. Please call w/ questions.     Thank you for allowing me to participate in this patient's care.  Please feel free to call me with any questions or concerns.    Milan Iniguez MD  4/18/2024

## 2024-04-18 NOTE — PROGRESS NOTES
[facility] note: Duplicate Proton Pump Inhibitor with Histamine 2 blocker.    Ramón Nunez is a 43 year old patient who has been prescribed both famotidine (Pepcid)  And pantoprazole (Protonix).  Pepcid was discontinued per [employer] approved protocol for duplicate therapy in adult patients for medications not ordered by gastroenterology.    Thank you,  Dorota Hoffmann, PharmD  4/17/2024

## 2024-04-18 NOTE — DISCHARGE SUMMARY
[facility]  Discharge Summary    Ramón Nunez Patient Status:  Inpatient    10/10/1980 MRN [id number]   Location [facility] 4NW-A Attending Chloe Dixon MD   Hosp Day # 2 PCP Uday Betancourt MD     Date of Admission: 4/15/2024    Date of Discharge:  2024      Disposition: Home or Self Care    Discharge Diagnosis:   # Acute pancreatitis due to hypertriglyceridemia     # Familial hypertriglyceridemia    # Diabetes mellitus type 2 with hyperglycemia on admission    # Pseudohyponatremia on admission due to hyperglycemia     # Thrombocytopenia      Chief Complaint:   Chief Complaint   Patient presents with    Abdomen/Flank Pain       History of Present Illness:   Ramón Nunez is a 43 year old male with history of familial hypertriglyceridemia, type 2 diabetes presents to the emergency room with increased abdominal pain similar to previous episodes of pancreatitis.  Patient has history of familial hypertriglyceridemia was started on Accutane..  Patient reportedly is on a drug trial through [facility].  Patient denies any nausea, vomiting, diarrhea or constipation.  No chest pain, shortness of breath, dizziness, lightheadedness, or syncope.     Brief Synopsis:   # Acute pancreatitis due to hypertriglyceridemia  -Lipase elevated on admission.    Initially treated conservatively for acute pancreatitis with n.p.o., IV fluids, IV pain medications as needed, IV Zofran.  Nausea vomiting.    -Lipid panel done on admission showed marked hypertriglyceridemia with triglycerides more than 5000 on admission  -Seen by nephrology on consult. plasmapheresis started by nephrology.  Triglycerides improving with this  -Abdominal pain improving.  Advance clear liquid diet   -Tolerating liquid diet, advanced diet to soft diet     # Familial hypertriglyceridemia  -Patient reportedly on a drug trial, patient supposedly to find out in the next week or so if he was actually getting the medication versus placebo.  -Seen by  nephrologist who started patient on plasmapheresis for triglycerides more than 5000.  Right internal jugular temporary hemodialysis catheter placed by interventional radiologist Dr. Gurinder Almonte on 4/16/2024 for the same  - continued home medications including fenofibrate once started on oral diet and tolerated this        # Diabetes mellitus type 2 with hyperglycemia on admission  -Hyperglycemia protocol  -Hemoglobin A1c of 9.3  -Treated on long-acting insulin degludec  -Treated on NovoLog correction factor insulin coverage as needed and also NovoLog carb counting also once started on oral diet  -Followed Accu-Cheks     # Pseudohyponatremia on admission due to hyperglycemia  -Blood sugar improving and sodium back to normal     # Thrombocytopenia  -Remains stable    Abdominal pain improving 1/10 now, tolerating diet.  Off plasmapheresis and right IJ temporary hemodialysis catheter removed today by nephrology .  Discharged in stable condition.  Follow-up with regular outpatient primary care physician within 1 week in office      TCM Diagnosis at discharge from Hospital: Other: See above; still recommend for TCM follow-up    Lace+ Score: 38  59-90 High Risk  29-58 Medium Risk  0-28   Low Risk.     TCM Follow-Up Recommendation:Ramón VILLATORO 29-58: Moderate Risk of readmission after discharge from the hospital.      PCP: Uday Betancourt MD    Consultations:   Consultants         Provider   Role Specialty     Milan Iniguez MD      Consulting Physician NEPHROLOGY         Procedures during hospitalization:   Right internal jugular temporary hemodialysis catheter placed by IR Dr. Saini on 4/16/2024 and patient had plasmapheresis done by nephrology  transiently in hospital.  Patient improved and off plasmapheresis and right IJ temporary hemodialysis catheter removed at the time of discharge.    Incidental or significant findings and recommendations (brief descriptions):  None    Lab/Test results pending at  Discharge:   None      Discharge Medications:        Discharge Medications        CONTINUE taking these medications        Instructions Prescription details   Esomeprazole Magnesium 40 MG Cpdr  Commonly known as: NEXIUM      Take 1 capsule (40 mg total) by mouth every morning before breakfast.   Refills: 0     Fenofibrate 160 MG Tabs      Take 1 tablet (160 mg total) by mouth daily.   Refills: 0     FreeStyle Lancets Misc      Test daily and prn   Quantity: 100 Each  Refills: 3     glipiZIDE ER 2.5 MG Tb24  Commonly known as: Glucotrol XL      Take 1 tablet (2.5 mg total) by mouth daily with breakfast.   Refills: 0     Jardiance 25 MG Tabs  Generic drug: Empagliflozin      Take 25 mg by mouth daily.   Refills: 0     metFORMIN HCl 1000 MG Tabs  Commonly known as: GLUCOPHAGE      Take 1 tablet (1,000 mg total) by mouth 2 (two) times daily with meals.   Refills: 0     rosuvastatin 40 MG Tabs  Commonly known as: Crestor      Take 1 tablet (40 mg total) by mouth nightly.   Refills: 0     Vascepa 1 g Caps  Generic drug: Icosapent Ethyl      Take 2 g by mouth in the morning and 2 g before bedtime.   Refills: 0            STOP taking these medications      HYDROcodone-acetaminophen 5-325 MG Tabs  Commonly known as: [employer] prescription monitoring program data reviewed in epic before prescribing narcotics/controlled substances: Not applicable as no narcotics prescribed    Follow up Visits: Follow-up with Uday Betancourt MD in 1 week    Uday Betancourt MD  [facility] [address] [place] [phone]    Schedule an appointment as soon as possible for a visit in 1 week(s)      Appointments for Next 30 Days 4/18/2024 - 5/18/2024      None                  Physical Exam:  /75 (BP Location: Left arm)   Pulse 103   Temp 99.3 °F (37.4 °C) (Oral)   Resp 16   Ht 68\"   Wt 216 lb 6.4 oz (98.2 kg)   SpO2 99%   BMI 32.90 kg/m²     General: No acute distress  Respiratory: Clear to  auscultation bilateral, no wheezes, no rhonchi  Cardiovascular: S1, S2, regular rate and rhythm  Abdomen: Soft, Non-tender, non-distended, positive bowel sounds  Neuro: No new focal deficits.   Extremities: No pedal edema  No calf tenderness    Discharge Condition: stable    Patient Discharge Instructions: See electronic chart      More than 30 minutes on discharge    Chloe Dixon MD  4/18/2024  1:47 PM

## 2024-04-18 NOTE — PROGRESS NOTES
[facility]   part of [facility]     Hospitalist Progress Note     Ramón Nunez Patient Status:  Inpatient    10/10/1980 MRN [id number]   Location [facility] 4NW-A Attending Chloe Dixon MD   Hosp Day # 2 PCP Uday Betancourt MD     Chief Complaint: Abdominal pain    Subjective:     Abdominal pain improving 1/10 now, tolerating diet.  Off plasmapheresis and right IJ temporary hemodialysis catheter removed today by nephrology  No nausea vomiting.  Afebrile.  Remains on room air.    Objective:    Review of Systems:   A comprehensive review of systems was completed; pertinent positive and negatives stated in subjective.    Vital signs:  Temp:  [99.3 °F (37.4 °C)-99.6 °F (37.6 °C)] 99.3 °F (37.4 °C)  Pulse:  [] 103  Resp:  [15-16] 16  BP: (119-135)/(74-75) 135/75  SpO2:  [96 %-99 %] 99 %    Physical Exam:    /75 (BP Location: Left arm)   Pulse 103   Temp 99.3 °F (37.4 °C) (Oral)   Resp 16   Ht 68\"   Wt 216 lb 6.4 oz (98.2 kg)   SpO2 99%   BMI 32.90 kg/m²     General: No acute distress  Respiratory: Clear to auscultation bilateral, no wheezes, no rhonchi  Cardiovascular: S1, S2, regular rate and rhythm  Abdomen: Soft, Non-tender, non-distended, positive bowel sounds  Neuro: No new focal deficits.   Extremities: No pedal edema  No calf tenderness    Diagnostic Data:    Labs:  Recent Labs   Lab 04/15/24  2004 04/17/24  0624  1002 24  0617   WBC 9.9 10.6 12.2* 11.6*   HGB 16.6 13.4 13.6 12.6*   MCV 81.3 84.7 90.4 85.2   .0 94.0* 104.0* 104.0*       Recent Labs   Lab 04/15/24  2004 04/15/24  2143 24  0624  0617   *  --  150* 135*   BUN 8*  --  9 7*   CREATSERUM 0.98  --  0.82 0.66*   CA  --  8.3* 8.5 8.4*   ALB 3.7  --  3.7 3.7   *  --  140 136   K  --   --  3.8 3.5   CL 99  --  108 103   CO2 21.0  --  17.0* 21.0   ALKPHO 69  --  30* 32*   AST  --   --  13* 7*   ALT 40  --  16 8*   BILT 0.8  --  1.4 1.0   TP 8.0  --  5.9* 6.2*        Estimated Creatinine Clearance: 139.6 mL/min (A) (based on SCr of 0.66 mg/dL (L)).    No results for input(s): \"TROP\", \"TROPHS\", \"CK\" in the last 168 hours.    No results for input(s): \"PTP\", \"INR\" in the last 168 hours.               Microbiology    No results found for this visit on 04/15/24.      Imaging: Reviewed in Epic.    Medications:    pantoprazole  40 mg Oral QAM AC    fenofibrate micronized  134 mg Oral Daily with breakfast    [Held by provider] Icosapent Ethyl  2 g Oral BID    rosuvastatin  40 mg Oral Nightly    enoxaparin  40 mg Subcutaneous Daily    insulin aspart  1-10 Units Subcutaneous TID AC and HS    insulin degludec  10 Units Subcutaneous Daily       Assessment & Plan:      # Acute pancreatitis due to hypertriglyceridemia  -Lipid panel showed marked hypertriglyceridemia with triglycerides more than 5000 on admission, improving with plasmapheresis started by nephrology.  Triglycerides improving  -Nephrology on consult  -Lipase elevated on admission  -Tolerating liquid diet, advance diet to soft diet  -Continue IV fluids  -Continue IV pain medication as needed.     # Familial hypertriglyceridemia  -Patient reportedly on a drug trial, patient supposedly to find out in the next week or so if he was actually getting the medication versus placebo.  -Seen by nephrologist who started patient on plasmapheresis for triglycerides more than 5000.  Right internal jugular temporary hemodialysis catheter placed by interventional radiologist Dr. Gurinder Almonte on 4/16/2024 for the same  - continue home medications including fenofibrate       # Diabetes mellitus type 2 with hyperglycemia on admission  -Hyperglycemia protocol  -Hemoglobin A1c of 9.3  -On long-acting insulin degludec  -On NovoLog correction factor insulin coverage as needed  -Will add NovoLog carb counting also once started on oral diet  -Follow Accu-Cheks    # Pseudohyponatremia on admission due to hyperglycemia  -Blood sugar improving and  sodium back to normal    # Thrombocytopenia  -Remains stable     Discussed with patient. discussed with RN    Discharge home today, follow-up with regular outpatient primary care physician within 1 week in office.    Chloe Dixon MD    Supplementary Documentation:     Quality:  DVT Mechanical Prophylaxis:   SCDs,    DVT Pharmacologic Prophylaxis   Medication    enoxaparin (Lovenox) 40 MG/0.4ML SUBQ injection 40 mg                Code Status: Not on file  Dolan: No urinary catheter in place  Dolan Duration (in days):   Central line:    VIANEY: 4/18/2024    Discharge is dependent on: Clinical progress  At this point Mr. Nunez is expected to be discharge to: Home    The 21st Century Cures Act makes medical notes like these available to patients in the interest of transparency. Please be advised this is a medical document. Medical documents are intended to carry relevant information, facts as evident, and the clinical opinion of the practitioner. The medical note is intended as peer to peer communication and may appear blunt or direct. It is written in medical language and may contain abbreviations or verbiage that are unfamiliar.

## 2024-04-18 NOTE — PROGRESS NOTES
Patient alert x4, VSS. No complaint of pain. All meds given per MAR. IV fluids infusing. Ambulates self. Call light within reach

## 2024-04-18 NOTE — DIETARY NOTE
[facility]   part of [facility]   CLINICAL NUTRITION    Ramón Nunez     Admitting diagnosis:  Hypertriglyceridemia, familial [E78.1]  Hyperglycemia [R73.9]  Other acute pancreatitis without infection or necrosis (HCC) [K85.80]    Ht: 172.7 cm (5' 8\")  Wt: 98.2 kg (216 lb 6.4 oz).   Body mass index is 32.9 kg/m².      Wt Readings from Last 6 Encounters:   04/16/24 98.2 kg (216 lb 6.4 oz)   02/05/24 98.9 kg (218 lb)   02/07/23 98.9 kg (218 lb)   07/07/22 97.5 kg (215 lb)   06/26/18 106.7 kg (235 lb 2 oz)   12/19/17 104.3 kg (230 lb)        Labs/Meds reviewed    Diet:       Procedures    Low Fiber/Soft diet Low Fiber/Soft; Is Patient on Accuchecks? Yes     Percent Meals Eaten (last 3 days)       None              Pt chart reviewed d/t need for DM EDU. A1c 9.3%  PT out of room on visit. Attempt on FU. Added DM edu to discharge summary.     Nursing notes reports intake for last meal.  Tolerating po diet without diarrhea, emesis, or constipation.   No significant weight changes noted.     Patient is at low nutrition risk at this time.    Please consult if patient status changes or nutrition issues arise.    Nichelle \"Elen\" RANJIT Zafar, LDN  Clinical Dietitian

## 2024-04-19 NOTE — PAYOR COMM NOTE
--------------  CONTINUED STAY REVIEW  4/17-4/18  Payor: [employer] OPEN ACCESS   Subscriber #:  [id number]  Authorization Number: [id number]    Admit date: 4/16/24  Admit time:  6:06 AM    Admitting Physician: Levi Alex DO  Attending Physician:  No att. providers found  Primary Care Physician: Uday Betancourt MD    REVIEW DOCUMENTATION:    4/17  Chief Complaint: Abdominal pain        Subjective:  Abdominal pain improving 1/10 now  No nausea vomiting.  Afebrile.  Remains on room air.    Temp:  [98.1 °F (36.7 °C)] 98.1 °F (36.7 °C)  Pulse:  [110-115] 115  Resp:  [18] 18  BP: (126-127)/(70-75) 127/70  SpO2:  [95 %-96 %] 96 %     Physical Exam:    /70 (BP Location: Right arm)   Pulse 115   Temp 98.1 °F (36.7 °C) (Temporal)   Resp 18   Ht 5' 8\" (1.727 m)   Wt 216 lb 6.4 oz (98.2 kg)   SpO2 96%   BMI 32.90 kg/m²      General: No acute distress  Respiratory: Clear to auscultation bilateral, no wheezes, no rhonchi  Cardiovascular: S1, S2, regular rate and rhythm  Abdomen: Soft, Non-tender, non-distended, positive bowel sounds  Neuro: No new focal deficits.   Extremities: No pedal edema  No calf tenderness     Diagnostic Data:    Labs:       Recent Labs   Lab 04/15/24  2004 04/17/24  0603   WBC 9.9 10.6   HGB 16.6 13.4   MCV 81.3 84.7   .0 94.0*               Recent Labs   Lab 04/15/24  2004 04/15/24  2143 04/17/24  0603   *  --  150*   BUN 8*  --  9   CREATSERUM 0.98  --  0.82   CA  --  8.3* 8.5   ALB 3.7  --  3.7   *  --  140   K  --   --  3.8   CL 99  --  108   CO2 21.0  --  17.0*   ALKPHO 69  --  30*   AST  --   --  13*   ALT 40  --  16   BILT 0.8  --  1.4   TP 8.0  --  5.9*     Scheduled Medications    enoxaparin  40 mg Subcutaneous Daily    insulin aspart  1-10 Units Subcutaneous TID AC and HS    insulin degludec  10 Units Subcutaneous Daily                  Assessment & Plan:  # Acute pancreatitis due to hypertriglyceridemia  -Lipid panel showed marked  hypertriglyceridemia with triglycerides more than 5000 on admission, improving with plasmapheresis started by nephrology  -Nephrology on consult  -Lipase elevated on admission  -clear liq diet to be started  -Continue IV fluids  -Continue IV pain medication as needed.     # Familial hypertriglyceridemia  -Patient reportedly on a drug trial, patient supposedly to find out in the next week or so if he was actually getting the medication versus placebo.  -Seen by nephrologist who started patient on plasmapheresis for triglycerides more than 5000.  Right internal jugular temporary hemodialysis catheter placed by interventional radiologist Dr. Gurinder Almonte on 4/16/2024 for the same     # Diabetes mellitus type 2 with hyperglycemia on admission  -Hyperglycemia protocol  -Hemoglobin A1c of 9.3  -On long-acting insulin degludec  -On NovoLog correction factor insulin coverage as needed  -Will add NovoLog carb counting also once started on oral diet  -Follow Accu-Cheks     # Pseudohyponatremia on admission due to hyperglycemia  -Blood sugar improving and sodium back to normal     # Thrombocytopenia  -Repeat CBC              4/18 nephrology  Impression:  Acute pancreatitis related to hypertriglyceridemia  (reportedly familial)  Improved  - per primary service   - no additional PLEX planned; OK to dc HD catheter  DM; poor control; A1C 9.3  Obesity   HTN - controlled; monitor   GERD                  4/18 IM  Abdominal pain improving 1/10 now, tolerating diet.  Off plasmapheresis and right IJ temporary hemodialysis catheter removed today by nephrology     Temp:  [99.3 °F (37.4 °C)-99.6 °F (37.6 °C)] 99.3 °F (37.4 °C)  Pulse:  [] 103  Resp:  [15-16] 16  BP: (119-135)/(74-75) 135/75  SpO2:  [96 %-99 %] 99 %     Physical Exam:    /75 (BP Location: Left arm)   Pulse 103   Temp 99.3 °F (37.4 °C) (Oral)   Resp 16   Ht 68\"   Wt 216 lb 6.4 oz (98.2 kg)   SpO2 99%   BMI 32.90 kg/m²      General: No acute  distress  Respiratory: Clear to auscultation bilateral, no wheezes, no rhonchi  Cardiovascular: S1, S2, regular rate and rhythm  Abdomen: Soft, Non-tender, non-distended, positive bowel sounds  Neuro: No new focal deficits.   Extremities: No pedal edema  No calf tenderness     Diagnostic Data:    Labs:         Recent Labs   Lab 04/15/24  2004 04/17/24  0603 04/17/24  1002 04/18/24  0617   WBC 9.9 10.6 12.2* 11.6*   HGB 16.6 13.4 13.6 12.6*   MCV 81.3 84.7 90.4 85.2   .0 94.0* 104.0* 104.0*                Recent Labs   Lab 04/15/24  2004 04/15/24  2143 04/17/24  0603 04/18/24  0617   *  --  150* 135*   BUN 8*  --  9 7*   CREATSERUM 0.98  --  0.82 0.66*   CA  --  8.3* 8.5 8.4*   ALB 3.7  --  3.7 3.7   *  --  140 136   K  --   --  3.8 3.5   CL 99  --  108 103   CO2 21.0  --  17.0* 21.0   ALKPHO 69  --  30* 32*   AST  --   --  13* 7*   ALT 40  --  16 8*   BILT 0.8  --  1.4 1.0   TP 8.0  --  5.9* 6.2*       Assessment & Plan:  # Acute pancreatitis due to hypertriglyceridemia  -Lipid panel showed marked hypertriglyceridemia with triglycerides more than 5000 on admission, improving with plasmapheresis started by nephrology.  Triglycerides improving  -Nephrology on consult  -Lipase elevated on admission  -Tolerating liquid diet, advance diet to soft diet  -Continue IV fluids  -Continue IV pain medication as needed.     # Familial hypertriglyceridemia  -Patient reportedly on a drug trial, patient supposedly to find out in the next week or so if he was actually getting the medication versus placebo.  -Seen by nephrologist who started patient on plasmapheresis for triglycerides more than 5000.  Right internal jugular temporary hemodialysis catheter placed by interventional radiologist Dr. Gurinder Almonte on 4/16/2024 for the same  - continue home medications including fenofibrate        # Diabetes mellitus type 2 with hyperglycemia on admission  -Hyperglycemia protocol  -Hemoglobin A1c of 9.3  -On long-acting  insulin degludec  -On NovoLog correction factor insulin coverage as needed  -Will add NovoLog carb counting also once started on oral diet  -Follow Accu-Cheks     # Pseudohyponatremia on admission due to hyperglycemia  -Blood sugar improving and sodium back to normal     # Thrombocytopenia  -Remains stable        MEDICATIONS ADMINISTERED IN LAST 1 DAY:  fenofibrate micronized (Lofibra) cap 134 mg       Date Action Dose Route User    Discharged on 2024 0947 Given 134 mg Oral Brissa Miller, RN          insulin degludec 100 units/mL flextouch 10 Units       Date Action Dose Route User    Discharged on 2024 0946 Given 10 Units Subcutaneous (Right Upper Arm) Brissa Miller RN            Vitals (last day) before discharge       Date/Time Temp Pulse Resp BP SpO2 Weight O2 Device O2 Flow Rate (L/min) [facility]    24 1245 99.3 °F (37.4 °C) 103 16 135/75 99 % -- None (Room air) 0 L/min KM    24 0514 99.6 °F (37.6 °C) 101 15 120/74 98 % -- None (Room air) -- AK    24 2051 99.5 °F (37.5 °C) 85 16 119/75 96 % -- None (Room air) -- MM                  --------------  DISCHARGE REVIEW    Payor: [employer] OPEN ACCESS   Subscriber #:  [id number]  Authorization Number: [id number]    Admit date: 24  Admit time:   6:06 AM  Discharge Date: 2024  3:00 PM     Admitting Physician: Levi Alex DO  Attending Physician:  No att. providers found  Primary Care Physician: Uday Betancourt MD          Discharge Summary Notes        Discharge Summary signed by Chloe Dixon MD at 2024  7:35 PM       Author: Chloe Dixon MD Specialty: HOSPITALIST, Internal Medicine Author Type: Physician    Filed: 2024  7:35 PM Date of Service: 2024  1:47 PM Status: Signed    : Chloe Dixon MD (Physician)         [facility]  Discharge Summary    Ramón Nunez Patient Status:  Inpatient    10/10/1980 MRN [id number]   [address] Attending  Chloe Dixon MD   Hosp Day # 2 PCP Uday Betancourt MD     Date of Admission: 4/15/2024    Date of Discharge:  4/18/2024      Disposition: Home or Self Care    Discharge Diagnosis:   # Acute pancreatitis due to hypertriglyceridemia     # Familial hypertriglyceridemia    # Diabetes mellitus type 2 with hyperglycemia on admission    # Pseudohyponatremia on admission due to hyperglycemia     # Thrombocytopenia      Chief Complaint:   Chief Complaint   Patient presents with    Abdomen/Flank Pain       History of Present Illness:   Ramón Nunez is a 43 year old male with history of familial hypertriglyceridemia, type 2 diabetes presents to the emergency room with increased abdominal pain similar to previous episodes of pancreatitis.  Patient has history of familial hypertriglyceridemia was started on Accutane..  Patient reportedly is on a drug trial through [facility].  Patient denies any nausea, vomiting, diarrhea or constipation.  No chest pain, shortness of breath, dizziness, lightheadedness, or syncope.     Brief Synopsis:   # Acute pancreatitis due to hypertriglyceridemia  -Lipase elevated on admission.    Initially treated conservatively for acute pancreatitis with n.p.o., IV fluids, IV pain medications as needed, IV Zofran.  Nausea vomiting.    -Lipid panel done on admission showed marked hypertriglyceridemia with triglycerides more than 5000 on admission  -Seen by nephrology on consult. plasmapheresis started by nephrology.  Triglycerides improving with this  -Abdominal pain improving.  Advance clear liquid diet   -Tolerating liquid diet, advanced diet to soft diet     # Familial hypertriglyceridemia  -Patient reportedly on a drug trial, patient supposedly to find out in the next week or so if he was actually getting the medication versus placebo.  -Seen by nephrologist who started patient on plasmapheresis for triglycerides more than 5000.  Right internal jugular temporary hemodialysis catheter placed by  interventional radiologist Dr. Gurinder Almonte on 4/16/2024 for the same  - continued home medications including fenofibrate once started on oral diet and tolerated this        # Diabetes mellitus type 2 with hyperglycemia on admission  -Hyperglycemia protocol  -Hemoglobin A1c of 9.3  -Treated on long-acting insulin degludec  -Treated on NovoLog correction factor insulin coverage as needed and also NovoLog carb counting also once started on oral diet  -Followed Accu-Cheks     # Pseudohyponatremia on admission due to hyperglycemia  -Blood sugar improving and sodium back to normal     # Thrombocytopenia  -Remains stable    Abdominal pain improving 1/10 now, tolerating diet.  Off plasmapheresis and right IJ temporary hemodialysis catheter removed today by nephrology .  Discharged in stable condition.  Follow-up with regular outpatient primary care physician within 1 week in office      TCM Diagnosis at discharge from Hospital: Other: See above; still recommend for TCM follow-up    Lace+ Score: 38  59-90 High Risk  29-58 Medium Risk  0-28   Low Risk.     TCM Follow-Up Recommendation:Ramón VILLATORO 29-58: Moderate Risk of readmission after discharge from the hospital.      PCP: Uday Betancourt MD    Consultations:   Consultants         Provider   Role Specialty     Milan Iniguez MD      Consulting Physician NEPHROLOGY         Procedures during hospitalization:   Right internal jugular temporary hemodialysis catheter placed by IR Dr. Saini on 4/16/2024 and patient had plasmapheresis done by nephrology  transiently in hospital.  Patient improved and off plasmapheresis and right IJ temporary hemodialysis catheter removed at the time of discharge.    Incidental or significant findings and recommendations (brief descriptions):  None    Lab/Test results pending at Discharge:   None      Discharge Medications:        Discharge Medications        CONTINUE taking these medications        Instructions Prescription  details   Esomeprazole Magnesium 40 MG Cpdr  Commonly known as: NEXIUM      Take 1 capsule (40 mg total) by mouth every morning before breakfast.   Refills: 0     Fenofibrate 160 MG Tabs      Take 1 tablet (160 mg total) by mouth daily.   Refills: 0     FreeStyle Lancets Misc      Test daily and prn   Quantity: 100 Each  Refills: 3     glipiZIDE ER 2.5 MG Tb24  Commonly known as: Glucotrol XL      Take 1 tablet (2.5 mg total) by mouth daily with breakfast.   Refills: 0     Jardiance 25 MG Tabs  Generic drug: Empagliflozin      Take 25 mg by mouth daily.   Refills: 0     metFORMIN HCl 1000 MG Tabs  Commonly known as: GLUCOPHAGE      Take 1 tablet (1,000 mg total) by mouth 2 (two) times daily with meals.   Refills: 0     rosuvastatin 40 MG Tabs  Commonly known as: Crestor      Take 1 tablet (40 mg total) by mouth nightly.   Refills: 0     Vascepa 1 g Caps  Generic drug: Icosapent Ethyl      Take 2 g by mouth in the morning and 2 g before bedtime.   Refills: 0            STOP taking these medications      HYDROcodone-acetaminophen 5-325 MG Tabs  Commonly known as: [facility] prescription monitoring program data reviewed in epic before prescribing narcotics/controlled substances: Not applicable as no narcotics prescribed    Follow up Visits: Follow-up with Uday Betancourt MD in 1 week    Uday Betancourt MD  [facility] [address] [place] [address] [id number] [phone]    Schedule an appointment as soon as possible for a visit in 1 week(s)      Appointments for Next 30 Days 4/18/2024 - 5/18/2024      None                  Physical Exam:  /75 (BP Location: Left arm)   Pulse 103   Temp 99.3 °F (37.4 °C) (Oral)   Resp 16   Ht 68\"   Wt 216 lb 6.4 oz (98.2 kg)   SpO2 99%   BMI 32.90 kg/m²     General: No acute distress  Respiratory: Clear to auscultation bilateral, no wheezes, no rhonchi  Cardiovascular: S1, S2, regular rate and rhythm  Abdomen: Soft, Non-tender, non-distended, positive bowel  sounds  Neuro: No new focal deficits.   Extremities: No pedal edema  No calf tenderness    Discharge Condition: stable    Patient Discharge Instructions: See electronic chart      More than 30 minutes on discharge    Chloe Dixon MD  4/18/2024  1:47 PM      Electronically signed by Chloe Dixon MD on 4/18/2024  7:35 PM         REVIEWER COMMENTS

## 2025-07-10 ENCOUNTER — HOSPITAL ENCOUNTER (EMERGENCY)
Age: 45
Discharge: HOME | End: 2025-07-10
Payer: COMMERCIAL

## 2025-07-10 VITALS
BODY MASS INDEX: 34.86 KG/M2 | HEART RATE: 87 BPM | DIASTOLIC BLOOD PRESSURE: 67 MMHG | RESPIRATION RATE: 16 BRPM | OXYGEN SATURATION: 98 % | WEIGHT: 230 LBS | TEMPERATURE: 98 F | HEIGHT: 68 IN | SYSTOLIC BLOOD PRESSURE: 138 MMHG

## 2025-07-10 DIAGNOSIS — S01.81XA FOREHEAD LACERATION, INITIAL ENCOUNTER: ICD-10-CM

## 2025-07-10 DIAGNOSIS — S09.90XA INJURY OF HEAD, INITIAL ENCOUNTER: Primary | ICD-10-CM

## 2025-07-10 PROCEDURE — 90471 IMMUNIZATION ADMIN: CPT

## 2025-07-10 PROCEDURE — 12014 RPR F/E/E/N/L/M 5.1-7.5 CM: CPT

## 2025-07-10 PROCEDURE — 99284 EMERGENCY DEPT VISIT MOD MDM: CPT

## 2025-07-11 NOTE — ED PROVIDER NOTES
Patient Seen in: [facility] [place]        History  Chief Complaint   Patient presents with    Laceration/Abrasion     A table fell onto his head as he was removing it from a truck,no syncope     Stated Complaint: head laceration    Subjective:   43 yo male presents to the emergency department with c/o head injury.  Patient states he was helping load a large table into the back of a truck.  He grabbed the table by the metal legs and the legs came off.  The table fell and hit him in the forehead.  He did not lose consciousness and is not on any blood thinners.  He is unsure of when he may have received his last tetanus shot.     The history is provided by the patient.                   Objective:     Past Medical History:    Acute pancreatitis (HCC)    Diabetes (HCC)    Elevated liver function tests    Glucose intolerance    HYPERTRIGLYCERIDEMIA    SEASONAL ALLERGIES              Past Surgical History:   Procedure Laterality Date    Back surgery                  Social History     Socioeconomic History    Marital status:      Spouse name: Robert    Number of children: 0    Years of education: 14   Occupational History    Occupation:      Comment: unemployed 12/2009   Tobacco Use    Smoking status: Never    Smokeless tobacco: Never   Vaping Use    Vaping status: Never Used   Substance and Sexual Activity    Alcohol use: Yes     Alcohol/week: 5.0 standard drinks of alcohol     Types: 5 Standard drinks or equivalent per week     Comment: socially    Drug use: No    Sexual activity: Yes     Partners: Female   Other Topics Concern     Service No    Blood Transfusions No    Caffeine Concern Yes     Comment: soda    Occupational Exposure No    Hobby Hazards No    Sleep Concern No    Stress Concern Yes    Weight Concern No    Special Diet No    Back Care No    Exercise Yes     Comment: weights, walking    Bike Helmet No    Seat Belt Yes    Self-Exams Yes   Social History Narrative     Lives with wife     Social Drivers of Health     Food Insecurity: No Food Insecurity (4/16/2024)    Food Insecurity     Food Insecurity: Never true   Transportation Needs: No Transportation Needs (4/16/2024)    Transportation Needs     Lack of Transportation: No   Housing Stability: Low Risk  (4/16/2024)    Housing Stability     Housing Instability: No                                Physical Exam    ED Triage Vitals [07/10/25 2021]   BP (!) 156/94   Pulse 100   Resp 18   Temp 98.4 °F (36.9 °C)   Temp src Skin   SpO2 97 %   O2 Device None (Room air)       Current Vitals:   Vital Signs  BP: (!) 156/94  Pulse: 100  Resp: 18  Temp: 98.4 °F (36.9 °C)  Temp src: Skin    Oxygen Therapy  SpO2: 97 %  O2 Device: None (Room air)            Physical Exam  Vitals and nursing note reviewed.   Constitutional:       General: He is not in acute distress.     Appearance: Normal appearance. He is normal weight. He is not ill-appearing.   HENT:      Head: Normocephalic.      Comments: 6.5 cm horizontal linear laceration to the mid forehead.  Bleeding controlled with pressure.      Nose: Nose normal.      Mouth/Throat:      Mouth: Mucous membranes are moist.      Pharynx: Oropharynx is clear.   Eyes:      Extraocular Movements: Extraocular movements intact.      Conjunctiva/sclera: Conjunctivae normal.      Pupils: Pupils are equal, round, and reactive to light.   Cardiovascular:      Rate and Rhythm: Normal rate and regular rhythm.      Pulses: Normal pulses.      Heart sounds: Normal heart sounds.   Pulmonary:      Effort: Pulmonary effort is normal. No respiratory distress.      Breath sounds: Normal breath sounds.   Musculoskeletal:         General: Normal range of motion.   Skin:     General: Skin is warm and dry.      Capillary Refill: Capillary refill takes less than 2 seconds.   Neurological:      General: No focal deficit present.      Mental Status: He is alert and oriented to person, place, and time.   Psychiatric:          Mood and Affect: Mood normal.         Behavior: Behavior normal.               ED Course  Labs Reviewed - No data to display                       MDM         Medical Decision Making  43 yo male with laceration to left forehead.  Immunization record was reviewed and patient's last tetanus shot was received in 2015.  Will update tetanus today.  Asepsis also ordered.  Do not feel the patient requires any imaging at this time.  He is alert and oriented x 4 and there was no loss of consciousness.  He is not currently on any blood thinners or aspirin.  Verbal consent received for procedure.    Laceration was anesthetized with lidocaine locally.  The wound was cleansed and irrigated copiously.  There was no visible foreign body within the wound. The wound was closed using 13 simple interrupted sutures with 6-0 nylon.  The quality of the closure was excellent.  Patient tolerated procedure well.    Discussed with patient supportive measurements and red flag symptoms to watch for in regards to his head injury.  Recommended limiting the use of anything with a lighted screen as this can cause eyestrain which can cause brain strain and make concussion symptoms last longer and be more intense.  No evidence of fracture or vascular injury.  Home care instructions given for laceration.  Patient to follow-up with PCP return as needed.      Risk  OTC drugs.        Disposition and Plan     Clinical Impression:  1. Injury of head, initial encounter    2. Forehead laceration, initial encounter         Disposition:  Discharge  7/10/2025  9:12 pm    Follow-up:  Uday Betancourt MD  [facility] [address] [place] [id number] [phone]    Follow up in 1 week(s)  As needed          Medications Prescribed:  Current Discharge Medication List                Supplementary Documentation:

## 2025-07-11 NOTE — ED INITIAL ASSESSMENT (HPI)
To ER for eval of a scalp laceration after a table he was lifting out of the bed of a truck,fell and struck him in the head. No syncope noted. Pt is a/ox4. Last Td is unknown.

## 2025-07-11 NOTE — DISCHARGE INSTRUCTIONS
Rest and drink plenty of fluids.   It is important to stay hydrated.   Take Tylenol and/or ibuprofen as needed for headaches or discomfort.   Limit your time to 20 minutes at a time when using anything with a lighted screen (phone, tablet, TV, or computer).    These devices cause eye strain, which then causes brain strain.   Some nausea with a head injury is normal.  You are allowed one free vomit.    We want to see you back if you are vomiting more than once.   Watch for any altered mental status, difficulty walking, or difficulty talking.   Follow up with your PCP in 3-5 days.     Keep wound clean and dry.   Do not get stitches wet for the first 24 hours.   After this wash with soap and water twice a day.   Apply triple antibiotic ointment twice a day for the 3 days.   Then leave open to air as the oxygen will help it to heal.   Take Tylenol and/or ibuprofen as needed for pain.   Have the stitches removed in 7 days.   Watch for any increased redness, swelling, or drainage.   Follow up with your PCP in 3-5 days.     Thank you for choosing [facility] for your care.

## 2025-07-11 NOTE — ED QUICK NOTES
To ER for eval of a forehead laceration as per the triage note. He states a metal table leg struck him as he ws removing the table. NO active bleeding is noted.

## 2025-07-11 NOTE — ED QUICK NOTES
The wound was irrigated and sutured without any problems noted. DC instr re wound care were given. To have the sutures removed in 7 days and return to the nearest ER if worse. To push fluids and avoid prolonged exposure to the sun. He expressed an understanding and was dc'd home,in [facility].

## (undated) NOTE — ED AVS SNAPSHOT
[facility] Emergency Department    [place]  [address] [address]    Phone:  [phone]    Fax:  [phone]           Elise Gaxiola   MRN: [id number]    Department:  [facility] Emergency Department   Date of Visit:  2/6/2 IF THERE IS ANY CHANGE OR WORSENING OF YOUR CONDITION, CALL YOUR PRIMARY CARE PHYSICIAN AT ONCE OR RETURN IMMEDIATELY TO THE EMERGENCY DEPARTMENT.     If you have been prescribed any medication(s), please fill your prescription right away and begin taking t

## (undated) NOTE — ED AVS SNAPSHOT
[facility] Emergency Department    [place]  [address] [address]    Phone:  [phone]    Fax:  [phone]           Antonio Torrez   MRN: [id number]    Department:  [facility] Emergency Department   Date of Visit:  2/6/2 Liquids for the next 24 hours and advance as tolerated. Start Prilosec. Your triglycerides are quite elevated, follow-up with primary care. Return if increasing pain, fevers, vomiting.   Cannot rule out an early atypical appendicitis if he developed any tell this physician (or your personal doctor if your instructions are to return to your personal doctor) about any new or lasting problems. The primary care or specialist physician will see patients referred from the [facility] Emergency Department.  Fran Bernard - If you are a smoker or have smoked in the last 12 months, we encourage you to explore options for quitting.     - If you have concerns related to behavioral health issues or thoughts of harming yourself, contact [facility] a PANCREAS:  Mild heterogenous with fatty infiltration, with underlying pancreatitis not entirely excluded. Clinical correlation recommended. SPLEEN:  Unremarkable measuring up to 13.8 cm  KIDNEYS:  No hydronephrosis. Right kidney measures 12.1 cm.   Left k

## (undated) NOTE — LETTER
[address]  [address]  Consent for Procedure/Sedation  Date: 4/16/2024         Time: 1915    I hereby authorize Dr Saini, my physician and his/her assistants (if applicable), which may include medical students, residents, and/or fellows, to perform the following surgical operation/ procedure and administer such anesthesia as may be determined necessary by my physician: Temporary dialysis catheter for plasmaphoresis on Ramón Nunez  2.   I recognize that during the surgical operation/procedure, unforeseen conditions may necessitate additional or different procedures than those listed above.  I, therefore, further authorize and request that the above-named surgeon, assistants, or designees perform such procedures as are, in their judgment, necessary and desirable.    3.   My surgeon/physician has discussed prior to my surgery the potential benefits, risks and side effects of this procedure; the likelihood of achieving goals; and potential problems that might occur during recuperation.  They also discussed reasonable alternatives to the procedure, including risks, benefits, and side effects related to the alternatives and risks related to not receiving this procedure.  I have had all my questions answered and I acknowledge that no guarantee has been made as to the result that may be obtained.    4.   Should the need arise during my operation/procedure, which includes change of level of care prior to discharge, I also consent to the administration of blood and/or blood products.  Further, I understand that despite careful testing and screening of blood or blood products by collecting agencies, I may still be subject to ill effects as a result of receiving a blood transfusion and/or blood products.  The following are some, but not all, of the potential risks that can occur: fever and allergic reactions, hemolytic reactions, transmission of diseases such as Hepatitis, AIDS and  Cytomegalovirus (CMV) and fluid overload.  In the event that I wish to have an autologous transfusion of my own blood, or a directed donor transfusion, I will discuss this with my physician.   Check only if Refusing Blood or Blood Products  I understand refusal of blood or blood products as deemed necessary by my physician may have serious consequences to my condition to include possible death. I hereby assume responsibility for my refusal and release the hospital, its personnel, and my physicians from any responsibility for the consequences of my refusal.         o  Refuse         5.   I authorize the use of any specimen, organs, tissues, body parts or foreign objects that may be removed from my body during the operation/procedure for diagnosis, research or teaching purposes and their subsequent disposal by hospital authorities.  I also authorize the release of specimen test results and/or written reports to my treating physician on the hospital medical staff or other referring or consulting physicians involved in my care, at the discretion of the Pathologist or my treating physician.    6.   I consent to the photographing or videotaping of the operations or procedures to be performed, including appropriate portions of my body for medical, scientific, or educational purposes, provided my identity is not revealed by the pictures or by descriptive texts accompanying them.  If the procedure has been photographed/videotaped, the surgeon will obtain the original picture, image, videotape or CD.  The hospital will not be responsible for storage, release or maintenance of the picture, image, tape or CD.    7.   I consent to the presence of a  or observers in the operating room as deemed necessary by my physician or their designees.    8.   I recognize that in the event my procedure results in extended X-Ray/fluoroscopy time, I may develop a skin reaction.    9. If I have a Do Not Attempt Resuscitation  (DNAR) order in place, that status will be suspended while in the operating room, procedural suite, and during the recovery period unless otherwise explicitly stated by me (or a person authorized to consent on my behalf). The surgeon or my attending physician will determine when the applicable recovery period ends for purposes of reinstating the DNAR order.  10. Patients having a sterilization procedure: I understand that if the procedure is successful the results will be permanent and it will therefore be impossible for me to inseminate, conceive, or bear children.  I also understand that the procedure is intended to result in sterility, although the result has not been guaranteed.   11. I acknowledge that my physician has explained sedation/analgesia administration to me including the risk and benefits I consent to the administration of sedation/analgesia as may be necessary or desirable in the judgment of my physician.    I CERTIFY THAT I HAVE READ AND FULLY UNDERSTAND THE ABOVE CONSENT TO OPERATION and/or OTHER PROCEDURE.        ____________________________________       _________________________________      ______________________________  Signature of Patient         Signature of Responsible Person        Printed Name of Responsible Person        ____________________________________      _________________________________      ______________________________       Signature of Witness          Relationship to Patient                       Date                                       Time  Patient Name: Ramón Nunez  : 10/10/1980    Reviewed: 2024   Printed: 2024  Medical Record #: [id number] Page 1 of 1